# Patient Record
Sex: FEMALE | Race: BLACK OR AFRICAN AMERICAN | Employment: UNEMPLOYED | URBAN - METROPOLITAN AREA
[De-identification: names, ages, dates, MRNs, and addresses within clinical notes are randomized per-mention and may not be internally consistent; named-entity substitution may affect disease eponyms.]

---
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
## 2022-09-07 ENCOUNTER — OFFICE VISIT (OUTPATIENT)
Dept: ENDOCRINOLOGY | Age: 22
End: 2022-09-07
Payer: COMMERCIAL

## 2022-09-07 VITALS
WEIGHT: 103 LBS | OXYGEN SATURATION: 94 % | BODY MASS INDEX: 18.95 KG/M2 | DIASTOLIC BLOOD PRESSURE: 84 MMHG | HEART RATE: 77 BPM | SYSTOLIC BLOOD PRESSURE: 125 MMHG | HEIGHT: 62 IN

## 2022-09-07 DIAGNOSIS — E55.9 VITAMIN D DEFICIENCY: ICD-10-CM

## 2022-09-07 DIAGNOSIS — R73.09 ABNORMAL GLUCOSE: Primary | ICD-10-CM

## 2022-09-07 DIAGNOSIS — R63.4 WEIGHT LOSS: ICD-10-CM

## 2022-09-07 LAB — HBA1C MFR BLD: 5.2 %

## 2022-09-07 PROCEDURE — G8420 CALC BMI NORM PARAMETERS: HCPCS | Performed by: CLINICAL NURSE SPECIALIST

## 2022-09-07 PROCEDURE — 1036F TOBACCO NON-USER: CPT | Performed by: CLINICAL NURSE SPECIALIST

## 2022-09-07 PROCEDURE — G8427 DOCREV CUR MEDS BY ELIG CLIN: HCPCS | Performed by: CLINICAL NURSE SPECIALIST

## 2022-09-07 PROCEDURE — 83036 HEMOGLOBIN GLYCOSYLATED A1C: CPT | Performed by: CLINICAL NURSE SPECIALIST

## 2022-09-07 PROCEDURE — 99204 OFFICE O/P NEW MOD 45 MIN: CPT | Performed by: CLINICAL NURSE SPECIALIST

## 2022-10-19 ENCOUNTER — APPOINTMENT (OUTPATIENT)
Dept: CT IMAGING | Age: 22
End: 2022-10-19
Payer: COMMERCIAL

## 2022-10-19 ENCOUNTER — HOSPITAL ENCOUNTER (EMERGENCY)
Age: 22
Discharge: HOME | End: 2022-10-19
Attending: EMERGENCY MEDICINE
Payer: COMMERCIAL

## 2022-10-19 ENCOUNTER — HOSPITAL ENCOUNTER (EMERGENCY)
Age: 22
Discharge: HOME | End: 2022-10-19
Payer: COMMERCIAL

## 2022-10-19 VITALS
SYSTOLIC BLOOD PRESSURE: 124 MMHG | HEART RATE: 92 BPM | BODY MASS INDEX: 20.67 KG/M2 | WEIGHT: 113 LBS | TEMPERATURE: 97.5 F | RESPIRATION RATE: 17 BRPM | OXYGEN SATURATION: 99 % | DIASTOLIC BLOOD PRESSURE: 85 MMHG

## 2022-10-19 VITALS
HEIGHT: 62 IN | TEMPERATURE: 97.3 F | OXYGEN SATURATION: 100 % | HEART RATE: 98 BPM | WEIGHT: 13 LBS | BODY MASS INDEX: 2.39 KG/M2 | SYSTOLIC BLOOD PRESSURE: 127 MMHG | RESPIRATION RATE: 16 BRPM | DIASTOLIC BLOOD PRESSURE: 80 MMHG

## 2022-10-19 DIAGNOSIS — R74.8 ELEVATED LIVER ENZYMES: ICD-10-CM

## 2022-10-19 DIAGNOSIS — R19.7 VOMITING AND DIARRHEA: ICD-10-CM

## 2022-10-19 DIAGNOSIS — N30.90 CYSTITIS: Primary | ICD-10-CM

## 2022-10-19 DIAGNOSIS — R11.10 VOMITING AND DIARRHEA: ICD-10-CM

## 2022-10-19 DIAGNOSIS — N39.0 URINARY TRACT INFECTION WITHOUT HEMATURIA, SITE UNSPECIFIED: Primary | ICD-10-CM

## 2022-10-19 LAB
ALBUMIN SERPL-MCNC: 4.7 G/DL (ref 3.5–5.2)
ALP BLD-CCNC: 72 U/L (ref 35–104)
ALT SERPL-CCNC: 95 U/L (ref 0–32)
ANION GAP SERPL CALCULATED.3IONS-SCNC: 13 MMOL/L (ref 7–16)
AST SERPL-CCNC: 44 U/L (ref 0–31)
BACTERIA: ABNORMAL /HPF
BACTERIA: ABNORMAL /HPF
BASOPHILS ABSOLUTE: 0.03 E9/L (ref 0–0.2)
BASOPHILS RELATIVE PERCENT: 0.3 % (ref 0–2)
BILIRUB SERPL-MCNC: 0.7 MG/DL (ref 0–1.2)
BILIRUBIN URINE: ABNORMAL
BILIRUBIN URINE: NEGATIVE
BLOOD, URINE: ABNORMAL
BLOOD, URINE: NEGATIVE
BUN BLDV-MCNC: 10 MG/DL (ref 6–20)
CALCIUM SERPL-MCNC: 9.4 MG/DL (ref 8.6–10.2)
CHLORIDE BLD-SCNC: 105 MMOL/L (ref 98–107)
CLARITY: CLEAR
CLARITY: CLEAR
CO2: 22 MMOL/L (ref 22–29)
COLOR: ABNORMAL
COLOR: YELLOW
CREAT SERPL-MCNC: 0.8 MG/DL (ref 0.5–1)
EOSINOPHILS ABSOLUTE: 0.08 E9/L (ref 0.05–0.5)
EOSINOPHILS RELATIVE PERCENT: 0.9 % (ref 0–6)
EPITHELIAL CELLS, UA: ABNORMAL /HPF
EPITHELIAL CELLS, UA: ABNORMAL /HPF
GFR SERPL CREATININE-BSD FRML MDRD: >60 ML/MIN/1.73
GLUCOSE BLD-MCNC: 90 MG/DL (ref 74–99)
GLUCOSE URINE: 250 MG/DL
GLUCOSE URINE: NEGATIVE MG/DL
HCG(URINE) PREGNANCY TEST: NEGATIVE
HCG, URINE, POC: NEGATIVE
HCT VFR BLD CALC: 43.2 % (ref 34–48)
HEMOGLOBIN: 13.6 G/DL (ref 11.5–15.5)
IMMATURE GRANULOCYTES #: 0.04 E9/L
IMMATURE GRANULOCYTES %: 0.4 % (ref 0–5)
KETONES, URINE: 40 MG/DL
KETONES, URINE: >=80 MG/DL
LACTIC ACID: 1.2 MMOL/L (ref 0.5–2.2)
LEUKOCYTE ESTERASE, URINE: ABNORMAL
LEUKOCYTE ESTERASE, URINE: ABNORMAL
LIPASE: 25 U/L (ref 13–60)
LYMPHOCYTES ABSOLUTE: 1.91 E9/L (ref 1.5–4)
LYMPHOCYTES RELATIVE PERCENT: 21 % (ref 20–42)
Lab: NORMAL
MCH RBC QN AUTO: 26.4 PG (ref 26–35)
MCHC RBC AUTO-ENTMCNC: 31.5 % (ref 32–34.5)
MCV RBC AUTO: 83.9 FL (ref 80–99.9)
MONOCYTES ABSOLUTE: 0.81 E9/L (ref 0.1–0.95)
MONOCYTES RELATIVE PERCENT: 8.9 % (ref 2–12)
NEGATIVE QC PASS/FAIL: NORMAL
NEUTROPHILS ABSOLUTE: 6.24 E9/L (ref 1.8–7.3)
NEUTROPHILS RELATIVE PERCENT: 68.5 % (ref 43–80)
NITRITE, URINE: NEGATIVE
NITRITE, URINE: POSITIVE
PDW BLD-RTO: 13.5 FL (ref 11.5–15)
PH UA: 5 (ref 5–9)
PH UA: 5.5 (ref 5–9)
PLATELET # BLD: 248 E9/L (ref 130–450)
PMV BLD AUTO: 9.3 FL (ref 7–12)
POSITIVE QC PASS/FAIL: NORMAL
POTASSIUM REFLEX MAGNESIUM: 4 MMOL/L (ref 3.5–5)
PROTEIN UA: 100 MG/DL
PROTEIN UA: NEGATIVE MG/DL
RBC # BLD: 5.15 E12/L (ref 3.5–5.5)
RBC UA: ABNORMAL /HPF (ref 0–2)
RBC UA: ABNORMAL /HPF (ref 0–2)
SODIUM BLD-SCNC: 140 MMOL/L (ref 132–146)
SPECIFIC GRAVITY UA: 1.02 (ref 1–1.03)
SPECIFIC GRAVITY UA: >=1.03 (ref 1–1.03)
TOTAL PROTEIN: 7.9 G/DL (ref 6.4–8.3)
UROBILINOGEN, URINE: 0.2 E.U./DL
UROBILINOGEN, URINE: >=8 E.U./DL
WBC # BLD: 9.1 E9/L (ref 4.5–11.5)
WBC UA: ABNORMAL /HPF (ref 0–5)
WBC UA: ABNORMAL /HPF (ref 0–5)

## 2022-10-19 PROCEDURE — 74177 CT ABD & PELVIS W/CONTRAST: CPT

## 2022-10-19 PROCEDURE — 83605 ASSAY OF LACTIC ACID: CPT

## 2022-10-19 PROCEDURE — 36415 COLL VENOUS BLD VENIPUNCTURE: CPT

## 2022-10-19 PROCEDURE — 87088 URINE BACTERIA CULTURE: CPT

## 2022-10-19 PROCEDURE — 99283 EMERGENCY DEPT VISIT LOW MDM: CPT

## 2022-10-19 PROCEDURE — 96375 TX/PRO/DX INJ NEW DRUG ADDON: CPT

## 2022-10-19 PROCEDURE — 81025 URINE PREGNANCY TEST: CPT

## 2022-10-19 PROCEDURE — 81001 URINALYSIS AUTO W/SCOPE: CPT

## 2022-10-19 PROCEDURE — 83690 ASSAY OF LIPASE: CPT

## 2022-10-19 PROCEDURE — 80053 COMPREHEN METABOLIC PANEL: CPT

## 2022-10-19 PROCEDURE — 85025 COMPLETE CBC W/AUTO DIFF WBC: CPT

## 2022-10-19 PROCEDURE — 2580000003 HC RX 258: Performed by: PHYSICIAN ASSISTANT

## 2022-10-19 PROCEDURE — 6360000002 HC RX W HCPCS: Performed by: PHYSICIAN ASSISTANT

## 2022-10-19 PROCEDURE — 96374 THER/PROPH/DIAG INJ IV PUSH: CPT

## 2022-10-19 PROCEDURE — 96361 HYDRATE IV INFUSION ADD-ON: CPT

## 2022-10-19 PROCEDURE — 6360000004 HC RX CONTRAST MEDICATION: Performed by: RADIOLOGY

## 2022-10-19 RX ORDER — DICYCLOMINE HYDROCHLORIDE 10 MG/1
20 CAPSULE ORAL
Qty: 24 CAPSULE | Refills: 0 | Status: SHIPPED | OUTPATIENT
Start: 2022-10-19 | End: 2022-10-22

## 2022-10-19 RX ORDER — KETOROLAC TROMETHAMINE 30 MG/ML
30 INJECTION, SOLUTION INTRAMUSCULAR; INTRAVENOUS ONCE
Status: COMPLETED | OUTPATIENT
Start: 2022-10-19 | End: 2022-10-19

## 2022-10-19 RX ORDER — NAPROXEN 500 MG/1
500 TABLET ORAL 2 TIMES DAILY
Qty: 60 TABLET | Refills: 0 | Status: SHIPPED | OUTPATIENT
Start: 2022-10-19

## 2022-10-19 RX ORDER — ONDANSETRON 4 MG/1
4 TABLET, ORALLY DISINTEGRATING ORAL EVERY 8 HOURS PRN
Qty: 10 TABLET | Refills: 0 | Status: SHIPPED | OUTPATIENT
Start: 2022-10-19

## 2022-10-19 RX ORDER — PHENAZOPYRIDINE HYDROCHLORIDE 100 MG/1
100 TABLET, FILM COATED ORAL 3 TIMES DAILY PRN
Qty: 6 TABLET | Refills: 0 | Status: SHIPPED | OUTPATIENT
Start: 2022-10-19 | End: 2022-10-22

## 2022-10-19 RX ORDER — 0.9 % SODIUM CHLORIDE 0.9 %
1000 INTRAVENOUS SOLUTION INTRAVENOUS ONCE
Status: COMPLETED | OUTPATIENT
Start: 2022-10-19 | End: 2022-10-19

## 2022-10-19 RX ORDER — NITROFURANTOIN 25; 75 MG/1; MG/1
100 CAPSULE ORAL 2 TIMES DAILY
Qty: 14 CAPSULE | Refills: 0 | Status: SHIPPED | OUTPATIENT
Start: 2022-10-19 | End: 2022-10-26

## 2022-10-19 RX ORDER — ONDANSETRON 2 MG/ML
4 INJECTION INTRAMUSCULAR; INTRAVENOUS ONCE
Status: COMPLETED | OUTPATIENT
Start: 2022-10-19 | End: 2022-10-19

## 2022-10-19 RX ADMIN — ONDANSETRON 4 MG: 2 INJECTION INTRAMUSCULAR; INTRAVENOUS at 20:15

## 2022-10-19 RX ADMIN — SODIUM CHLORIDE 1000 ML: 9 INJECTION, SOLUTION INTRAVENOUS at 21:38

## 2022-10-19 RX ADMIN — IOPAMIDOL 75 ML: 755 INJECTION, SOLUTION INTRAVENOUS at 21:19

## 2022-10-19 RX ADMIN — WATER 1000 MG: 1 INJECTION INTRAMUSCULAR; INTRAVENOUS; SUBCUTANEOUS at 21:38

## 2022-10-19 RX ADMIN — KETOROLAC TROMETHAMINE 30 MG: 30 INJECTION, SOLUTION INTRAMUSCULAR at 20:14

## 2022-10-19 ASSESSMENT — ENCOUNTER SYMPTOMS
EYE REDNESS: 0
EYE PAIN: 0
DIARRHEA: 0
BLOOD IN STOOL: 0
VOMITING: 0
EYE DISCHARGE: 0
NAUSEA: 0
WHEEZING: 0
SORE THROAT: 0
SHORTNESS OF BREATH: 0
ABDOMINAL DISTENTION: 0
ABDOMINAL PAIN: 0
BACK PAIN: 0
SINUS PRESSURE: 0
COUGH: 0

## 2022-10-19 ASSESSMENT — PAIN DESCRIPTION - PAIN TYPE
TYPE: ACUTE PAIN
TYPE: ACUTE PAIN

## 2022-10-19 ASSESSMENT — PAIN - FUNCTIONAL ASSESSMENT
PAIN_FUNCTIONAL_ASSESSMENT: 0-10
PAIN_FUNCTIONAL_ASSESSMENT: 0-10

## 2022-10-19 ASSESSMENT — PAIN SCALES - GENERAL
PAINLEVEL_OUTOF10: 4
PAINLEVEL_OUTOF10: 4

## 2022-10-19 ASSESSMENT — PAIN DESCRIPTION - LOCATION
LOCATION: ABDOMEN
LOCATION: ABDOMEN

## 2022-10-19 ASSESSMENT — PAIN DESCRIPTION - ORIENTATION
ORIENTATION: LOWER;MID
ORIENTATION: LOWER

## 2022-10-19 ASSESSMENT — PAIN DESCRIPTION - FREQUENCY: FREQUENCY: CONTINUOUS

## 2022-10-19 ASSESSMENT — PAIN DESCRIPTION - ONSET: ONSET: ON-GOING

## 2022-10-19 ASSESSMENT — PAIN DESCRIPTION - DESCRIPTORS: DESCRIPTORS: SHARP

## 2022-10-19 NOTE — ED PROVIDER NOTES
[facility]   HPI:  10/19/22, Time: 6:18 PM EDT         Tirso Reddy is a 23 y.o. female presenting to the ED for abdominal pain , beginning 1 Day  ago. The complaint has been persistent, moderate in severity, and worsened by eating . Patient comes in with complaint of generalized abdominal pain with nausea vomiting and diarrhea. She states pain started last night with 2 episodes of vomiting. She denies any hematemesis. No black tarry stools or bloody stools. She was seen earlier at urgent care was treated for a UTI placed on Macrobid and Pyridium and had an episode of vomiting after that. Pain is persistent crampy pain that radiates towards her back denies any fever or chills. Patient denies any urinary frequency urgency burning. No vaginal discharges or vaginal bleeding. Review of Systems:   A complete review of systems was performed and pertinent positives and negatives are stated within HPI, all other systems reviewed and are negative.          --------------------------------------------- PAST HISTORY ---------------------------------------------  Past Medical History:  has a past medical history of Anemia. Past Surgical History:  has no past surgical history on file. Social History:  reports that she has never smoked. She has been exposed to tobacco smoke. She has never used smokeless tobacco. She reports current drug use. Drug: Marijuana Jayce Soria). She reports that she does not drink alcohol. Family History: family history includes Diabetes type 2  in her maternal grandfather; High Blood Pressure in her mother; Thyroid Disease in her mother. The patients home medications have been reviewed. Allergies: Patient has no known allergies.     -------------------------------------------------- RESULTS -------------------------------------------------  All laboratory and radiology results have been personally reviewed by myself   LABS:  Results for orders placed or performed during the hospital encounter of 10/19/22   CBC with Auto Differential   Result Value Ref Range    WBC 9.1 4.5 - 11.5 E9/L    RBC 5.15 3.50 - 5.50 E12/L    Hemoglobin 13.6 11.5 - 15.5 g/dL    Hematocrit 43.2 34.0 - 48.0 %    MCV 83.9 80.0 - 99.9 fL    MCH 26.4 26.0 - 35.0 pg    MCHC 31.5 (L) 32.0 - 34.5 %    RDW 13.5 11.5 - 15.0 fL    Platelets 404 437 - 780 E9/L    MPV 9.3 7.0 - 12.0 fL    Neutrophils % 68.5 43.0 - 80.0 %    Immature Granulocytes % 0.4 0.0 - 5.0 %    Lymphocytes % 21.0 20.0 - 42.0 %    Monocytes % 8.9 2.0 - 12.0 %    Eosinophils % 0.9 0.0 - 6.0 %    Basophils % 0.3 0.0 - 2.0 %    Neutrophils Absolute 6.24 1.80 - 7.30 E9/L    Immature Granulocytes # 0.04 E9/L    Lymphocytes Absolute 1.91 1.50 - 4.00 E9/L    Monocytes Absolute 0.81 0.10 - 0.95 E9/L    Eosinophils Absolute 0.08 0.05 - 0.50 E9/L    Basophils Absolute 0.03 0.00 - 0.20 E9/L   Comprehensive Metabolic Panel w/ Reflex to MG   Result Value Ref Range    Sodium 140 132 - 146 mmol/L    Potassium reflex Magnesium 4.0 3.5 - 5.0 mmol/L    Chloride 105 98 - 107 mmol/L    CO2 22 22 - 29 mmol/L    Anion Gap 13 7 - 16 mmol/L    Glucose 90 74 - 99 mg/dL    BUN 10 6 - 20 mg/dL    Creatinine 0.8 0.5 - 1.0 mg/dL    Est, Glom Filt Rate >60 >=60 mL/min/1.73    Calcium 9.4 8.6 - 10.2 mg/dL    Total Protein 7.9 6.4 - 8.3 g/dL    Albumin 4.7 3.5 - 5.2 g/dL    Total Bilirubin 0.7 0.0 - 1.2 mg/dL    Alkaline Phosphatase 72 35 - 104 U/L    ALT 95 (H) 0 - 32 U/L    AST 44 (H) 0 - 31 U/L   Lipase   Result Value Ref Range    Lipase 25 13 - 60 U/L   Lactic Acid   Result Value Ref Range    Lactic Acid 1.2 0.5 - 2.2 mmol/L   Urinalysis with Microscopic   Result Value Ref Range    Color, UA ORANGE (A) Straw/Yellow    Clarity, UA Clear Clear    Glucose, Ur 250 (A) Negative mg/dL    Bilirubin Urine MODERATE (A) Negative    Ketones, Urine >=80 (A) Negative mg/dL    Specific Gravity, UA 1.025 1.005 - 1.030    Blood, Urine Negative Negative    pH, UA 5.0 5.0 - 9.0 Protein,  (A) Negative mg/dL    Urobilinogen, Urine >=8.0 (A) <2.0 E.U./dL    Nitrite, Urine POSITIVE (A) Negative    Leukocyte Esterase, Urine SMALL (A) Negative    WBC, UA 10-20 (A) 0 - 5 /HPF    RBC, UA 0-1 0 - 2 /HPF    Epithelial Cells, UA MODERATE /HPF    Bacteria, UA FEW (A) None Seen /HPF   POC Pregnancy Urine Qual   Result Value Ref Range    HCG, Urine, POC Negative Negative    Lot Number [id number]     Positive QC Pass/Fail Acceptable     Negative QC Pass/Fail Acceptable        RADIOLOGY:  Interpreted by Radiologist.  CT ABDOMEN PELVIS W IV CONTRAST Additional Contrast? None   Final Result   1. Minimal nonspecific free fluid located in cul-de-sac of the pelvis. 2. No bowel obstruction, free air, or focal inflammatory changes. ------------------------- NURSING NOTES AND VITALS REVIEWED ---------------------------   The nursing notes within the ED encounter and vital signs as below have been reviewed. /85   Pulse 92   Temp 97.5 °F (36.4 °C)   Resp 17   Wt 113 lb (51.3 kg)   SpO2 99%   BMI 20.67 kg/m²   Oxygen Saturation Interpretation: Normal      ---------------------------------------------------PHYSICAL EXAM--------------------------------------      Constitutional/General: Alert and oriented x3, well appearing, non toxic in NAD  Head: Normocephalic and atraumatic  Eyes: PERRL, EOMI  Mouth: Oropharynx clear, handling secretions, no trismus  Neck: Supple, full ROM,   Pulmonary: Lungs clear to auscultation bilaterally, no wheezes, rales, or rhonchi. Not in respiratory distress  Cardiovascular:  Regular rate and rhythm, no murmurs, gallops, or rubs. 2+ distal pulses  Abdomen: Soft, generalized tenderness greater right lower quadrant non distended, bilateral CVA tenderness  Extremities: Moves all extremities x 4.  Warm and well perfused  Skin: warm and dry without rash  Neurologic: GCS 15,  Psych: Normal Affect      ------------------------------ ED COURSE/MEDICAL DECISION MAKING----------------------  Medications   ketorolac (TORADOL) injection 30 mg (30 mg IntraVENous Given 10/19/22 2014)   ondansetron (ZOFRAN) injection 4 mg (4 mg IntraVENous Given 10/19/22 2015)   0.9 % sodium chloride bolus (0 mLs IntraVENous Stopped 10/19/22 2216)   cefTRIAXone (ROCEPHIN) 1,000 mg in sterile water 10 mL IV syringe (1,000 mg IntraVENous Given 10/19/22 2138)   iopamidol (ISOVUE-370) 76 % injection 75 mL (75 mLs IntraVENous Given 10/19/22 2119)         ED COURSE:     2155 updated on CT findings. She is to receive remainder of IV fluids and give a p.o. challenge if tolerated discharge to home      Medical Decision Making:    Patient was seen earlier today at urgent care treated for UTI placed on Macrobid and Pyridium. Patient was complaining of generalized abdominal pain with nausea vomiting that started last evening. Labs were obtained there was no leukocytosis there was elevated ALT AST. On CT gallbladder without any acute findings. No appendicitis present there is some free fluid in the pelvis possible ovarian cyst rupture. She was given a dose of Rocephin for the UTI she can continue with the Macrobid and Pyridium discharged with some Bentyl Naprosyn and Zofran. Follow-up primary care 1 to 2 days return to ER any worsening symptoms    Counseling: The emergency provider has spoken with the patient and discussed todays results, in addition to providing specific details for the plan of care and counseling regarding the diagnosis and prognosis. Questions are answered at this time and they are agreeable with the plan.      --------------------------------- IMPRESSION AND DISPOSITION ---------------------------------    IMPRESSION  1. Urinary tract infection without hematuria, site unspecified    2. Elevated liver enzymes    3.  Vomiting and diarrhea        DISPOSITION  Disposition: Discharge to home  Patient condition is good      NOTE: This report was transcribed using voice recognition software.  Every effort was made to ensure accuracy; however, inadvertent computerized transcription errors may be present      Marcus Dale  10/19/22 3156

## 2022-10-19 NOTE — ED PROVIDER NOTES
The history is provided by the patient. Dysuria   This is a new problem. The current episode started 2 days ago. The problem occurs intermittently. The pain is mild. There has been no fever. Associated symptoms include frequency, urgency and flank pain. Pertinent negatives include no chills, no nausea, no vomiting and no discharge. Review of Systems   Constitutional:  Negative for chills and fever. HENT:  Negative for ear pain, sinus pressure and sore throat. Eyes:  Negative for pain, discharge and redness. Respiratory:  Negative for cough, shortness of breath and wheezing. Cardiovascular:  Negative for chest pain. Gastrointestinal:  Negative for abdominal distention, abdominal pain, blood in stool, diarrhea, nausea and vomiting. Genitourinary:  Positive for dysuria, flank pain, frequency and urgency. Musculoskeletal:  Negative for arthralgias and back pain. Skin:  Negative for rash and wound. Neurological:  Negative for weakness and headaches. Hematological:  Negative for adenopathy. All other systems reviewed and are negative. Physical Exam  Vitals and nursing note reviewed. Constitutional:       Appearance: She is well-developed. HENT:      Head: Normocephalic and atraumatic. Right Ear: Hearing and external ear normal.      Left Ear: Hearing and external ear normal.      Nose: Nose normal.      Mouth/Throat:      Pharynx: Uvula midline. Eyes:      General: Lids are normal.      Conjunctiva/sclera: Conjunctivae normal.      Pupils: Pupils are equal, round, and reactive to light. Cardiovascular:      Rate and Rhythm: Normal rate and regular rhythm. Heart sounds: Normal heart sounds. No murmur heard. Pulmonary:      Effort: Pulmonary effort is normal. No respiratory distress. Breath sounds: Normal breath sounds. No wheezing or rales. Abdominal:      General: Bowel sounds are normal.      Palpations: Abdomen is soft. Abdomen is not rigid.       Tenderness: There is abdominal tenderness in the suprapubic area. There is no guarding or rebound. Musculoskeletal:      Cervical back: Normal range of motion and neck supple. Skin:     General: Skin is warm and dry. Findings: No abrasion or rash. Neurological:      Mental Status: She is alert and oriented to person, place, and time. GCS: GCS eye subscore is 4. GCS verbal subscore is 5. GCS motor subscore is 6. Cranial Nerves: No cranial nerve deficit. Sensory: No sensory deficit. Coordination: Coordination normal.      Gait: Gait normal.        Procedures     MDM          --------------------------------------------- PAST HISTORY ---------------------------------------------  Past Medical History:  has a past medical history of Anemia. Past Surgical History:  has no past surgical history on file. Social History:  reports that she has never smoked. She has been exposed to tobacco smoke. She has never used smokeless tobacco. She reports current drug use. Drug: Marijuana Tanner Hail). She reports that she does not drink alcohol. Family History: family history includes Diabetes type 2  in her maternal grandfather; High Blood Pressure in her mother; Thyroid Disease in her mother. The patients home medications have been reviewed. Allergies: Patient has no known allergies.     -------------------------------------------------- RESULTS -------------------------------------------------  Labs:  Results for orders placed or performed during the hospital encounter of 10/19/22   Urinalysis   Result Value Ref Range    Color, UA Yellow Straw/Yellow    Clarity, UA Clear Clear    Glucose, Ur Negative Negative mg/dL    Bilirubin Urine Negative Negative    Ketones, Urine 40 (A) Negative mg/dL    Specific Gravity, UA >=1.030 1.005 - 1.030    Blood, Urine TRACE (A) Negative    pH, UA 5.5 5.0 - 9.0    Protein, UA Negative Negative mg/dL    Urobilinogen, Urine 0.2 <2.0 E.U./dL    Nitrite, Urine Negative Negative    Leukocyte Esterase, Urine TRACE (A) Negative   Pregnancy, Urine   Result Value Ref Range    HCG(Urine) Pregnancy Test NEGATIVE NEGATIVE   Microscopic Urinalysis   Result Value Ref Range    WBC, UA 1-3 0 - 5 /HPF    RBC, UA 0-1 0 - 2 /HPF    Epithelial Cells, UA MODERATE /HPF    Bacteria, UA MODERATE (A) None Seen /HPF       Radiology:  No orders to display       ------------------------- NURSING NOTES AND VITALS REVIEWED ---------------------------  Date / Time Roomed:  10/19/2022 12:23 PM  ED Bed Assignment:  01/01    The nursing notes within the ED encounter and vital signs as below have been reviewed. /80   Pulse 98   Temp 97.3 °F (36.3 °C) (Temporal)   Resp 16   Ht 5' 2\" (1.575 m)   Wt 13 lb (5.897 kg)   SpO2 100%   BMI 2.38 kg/m²   Oxygen Saturation Interpretation: Normal      ------------------------------------------ PROGRESS NOTES ------------------------------------------  I have spoken with the patient and discussed todays results, in addition to providing specific details for the plan of care and counseling regarding the diagnosis and prognosis. Their questions are answered at this time and they are agreeable with the plan. I discussed at length with them reasons for immediate return here for re evaluation. They will followup with primary care by calling their office tomorrow. Medications - No data to display      --------------------------------- ADDITIONAL PROVIDER NOTES ---------------------------------  At this time the patient is without objective evidence of an acute process requiring hospitalization or inpatient management. They have remained hemodynamically stable throughout their entire ED visit and are stable for discharge with outpatient follow-up. The plan has been discussed in detail and they are aware of the specific conditions for emergent return, as well as the importance of follow-up.       New Prescriptions    NITROFURANTOIN, MACROCRYSTAL-MONOHYDRATE, (MACROBID) 100 MG CAPSULE    Take 1 capsule by mouth 2 times daily for 7 days    PHENAZOPYRIDINE (PYRIDIUM) 100 MG TABLET    Take 1 tablet by mouth 3 times daily as needed for Pain       Diagnosis:  1. Cystitis        Disposition:  Patient's disposition: Discharge to home  Patient's condition is stable.                     Naif Okeefe MD  10/19/22 8991

## 2022-10-22 LAB — URINE CULTURE, ROUTINE: NORMAL

## 2023-06-26 ENCOUNTER — HOSPITAL ENCOUNTER (OUTPATIENT)
Age: 23
Discharge: HOME | End: 2023-06-26
Payer: COMMERCIAL

## 2023-06-26 LAB
BASOPHILS # BLD: 0.04 E9/L (ref 0–0.2)
BASOPHILS NFR BLD: 0.5 % (ref 0–2)
C3 SERPL-MCNC: 153 MG/DL (ref 90–180)
C4 SERPL-MCNC: 28 MG/DL (ref 10–40)
EOSINOPHIL # BLD: 0.47 E9/L (ref 0.05–0.5)
EOSINOPHIL NFR BLD: 5.8 % (ref 0–6)
ERYTHROCYTE [DISTWIDTH] IN BLOOD BY AUTOMATED COUNT: 14.2 FL (ref 11.5–15)
HCT VFR BLD AUTO: 44.2 % (ref 34–48)
HGB BLD-MCNC: 13.8 G/DL (ref 11.5–15.5)
IMM GRANULOCYTES # BLD: 0.03 E9/L
IMM GRANULOCYTES NFR BLD: 0.4 % (ref 0–5)
LYMPHOCYTES # BLD: 2.17 E9/L (ref 1.5–4)
LYMPHOCYTES NFR BLD: 27 % (ref 20–42)
MCH RBC QN AUTO: 26.7 PG (ref 26–35)
MCHC RBC AUTO-ENTMCNC: 31.2 % (ref 32–34.5)
MCV RBC AUTO: 85.7 FL (ref 80–99.9)
MONOCYTES # BLD: 0.48 E9/L (ref 0.1–0.95)
MONOCYTES NFR BLD: 6 % (ref 2–12)
NEUTROPHILS # BLD: 4.85 E9/L (ref 1.8–7.3)
NEUTS SEG NFR BLD: 60.3 % (ref 43–80)
PLATELET # BLD AUTO: 264 E9/L (ref 130–450)
PMV BLD AUTO: 9.1 FL (ref 7–12)
RBC # BLD AUTO: 5.16 E12/L (ref 3.5–5.5)
WBC # BLD: 8 E9/L (ref 4.5–11.5)

## 2023-06-26 PROCEDURE — 36415 COLL VENOUS BLD VENIPUNCTURE: CPT

## 2023-06-26 PROCEDURE — 82785 ASSAY OF IGE: CPT

## 2023-06-26 PROCEDURE — 83520 IMMUNOASSAY QUANT NOS NONAB: CPT

## 2023-06-26 PROCEDURE — 86160 COMPLEMENT ANTIGEN: CPT

## 2023-06-26 PROCEDURE — 86162 COMPLEMENT TOTAL (CH50): CPT

## 2023-06-26 PROCEDURE — 85025 COMPLETE CBC W/AUTO DIFF WBC: CPT

## 2023-06-29 LAB
COMPLEMENT ACTIVITY, TOTAL TURBIDIMETRIC: >95 U/ML (ref 38.7–89.9)
IGE SERPL-ACNC: 40 KU/L
MISCELLANEOUS LAB TEST RESULT: NORMAL

## 2023-07-14 ENCOUNTER — HOSPITAL ENCOUNTER (OUTPATIENT)
Age: 23
Discharge: HOME | End: 2023-07-14
Payer: COMMERCIAL

## 2023-07-14 DIAGNOSIS — R30.0 DYSURIA: ICD-10-CM

## 2023-07-14 LAB
BACTERIA URNS QL MICRO: NORMAL /HPF
BILIRUB UR QL STRIP: NEGATIVE
CLARITY UR: CLEAR
COLOR UR: YELLOW
EPI CELLS #/AREA URNS HPF: NORMAL /HPF
GLUCOSE UR STRIP-MCNC: NEGATIVE MG/DL
HGB UR QL STRIP: NEGATIVE
KETONES UR STRIP-MCNC: NEGATIVE MG/DL
LEUKOCYTE ESTERASE UR QL STRIP: ABNORMAL
NITRITE UR QL STRIP: NEGATIVE
PH UR STRIP: 6 [PH] (ref 5–9)
PROT UR STRIP-MCNC: NEGATIVE MG/DL
RBC #/AREA URNS HPF: NORMAL /HPF (ref 0–2)
SP GR UR STRIP: 1.02 (ref 1–1.03)
UROBILINOGEN UR STRIP-ACNC: 0.2 E.U./DL
WBC #/AREA URNS HPF: NORMAL /HPF (ref 0–5)

## 2023-07-14 PROCEDURE — 81001 URINALYSIS AUTO W/SCOPE: CPT

## 2023-07-14 PROCEDURE — 87088 URINE BACTERIA CULTURE: CPT

## 2023-07-16 LAB — BACTERIA UR CULT: NORMAL

## 2023-08-01 DIAGNOSIS — M79.672 LEFT FOOT PAIN: Primary | ICD-10-CM

## 2023-08-08 ENCOUNTER — OFFICE VISIT (OUTPATIENT)
Dept: PODIATRY | Age: 23
End: 2023-08-08
Payer: COMMERCIAL

## 2023-08-08 VITALS — HEIGHT: 62 IN | WEIGHT: 123 LBS | BODY MASS INDEX: 22.63 KG/M2

## 2023-08-08 DIAGNOSIS — M79.672 PAIN IN LEFT FOOT: ICD-10-CM

## 2023-08-08 DIAGNOSIS — R26.2 DIFFICULTY WALKING: ICD-10-CM

## 2023-08-08 DIAGNOSIS — S93.622A LISFRANC'S SPRAIN, LEFT, INITIAL ENCOUNTER: Primary | ICD-10-CM

## 2023-08-08 PROCEDURE — G8427 DOCREV CUR MEDS BY ELIG CLIN: HCPCS | Performed by: PODIATRIST

## 2023-08-08 PROCEDURE — 99203 OFFICE O/P NEW LOW 30 MIN: CPT | Performed by: PODIATRIST

## 2023-08-08 PROCEDURE — G8420 CALC BMI NORM PARAMETERS: HCPCS | Performed by: PODIATRIST

## 2023-08-08 PROCEDURE — 1036F TOBACCO NON-USER: CPT | Performed by: PODIATRIST

## 2023-08-09 NOTE — PROGRESS NOTES
Patient is in today for evaluation of left foot pain. Patient says she hurt her foot last year and said it has continued to hurt, swell, and has tingling in her toes.  Pcp is HAIDER Cornejo - CNP  Last ov 7/8/23
Mother     Thyroid Disease Mother     Diabetes type 2  Maternal Grandfather      No past surgical history on file. Social History     Tobacco Use    Smoking status: Never     Passive exposure: Yes    Smokeless tobacco: Never   Vaping Use    Vaping Use: Never used   Substance Use Topics    Alcohol use: No    Drug use: Yes     Types: Marijuana Corina Brian)     Comment: occ           Diagnostic studies:    No results found. Procedures:    None    Exam:  VASCULAR: Pedal pulses palpable left foot. Capillary refill time brisk digits 1 through 5 left foot  NEUROLOGICAL: Epicritic sensations intact left foot  DERMATOLOGICAL: Mild edema noted overlying the left midfoot region, without ecchymotic skin changes present. MUSCULOSKELETAL: Tenderness noted to palpation to the Lisfranc ligament area medial first/second metatarsal cuneiform region. Tenderness also noted to palpation into the plantar left arch region. Mild antalgic gait noted upon evaluation. Plan Per Assessment  Bindu Ibarra was seen today for foot pain. Diagnoses and all orders for this visit:    Lisfranc's sprain, left, initial encounter    Pain in left foot    Difficulty walking        New patient evaluation and management  Radiographs were ordered and reviewed with patient 3 views left foot. No acute osseous abnormalities noted left foot. We did discuss obtaining an MRI of the left midfoot region to assess integrity of the Lisfranc ligament area due to initial injury several months ago and continued disability, swelling, and pain present. Due to severity of symptoms, chronicity of symptoms, physical exam, and failure of conservative treatment, an MRI of the left midfoot will be obtained to further delineate pathology and aid in medical decision making/surgical planning. Patient will follow up to discuss results, and further treatment options available for care.       Seen By:    Ruthy Barkley DPM    Electronically signed by Gwendolyn Turk

## 2023-08-29 ENCOUNTER — HOSPITAL ENCOUNTER (EMERGENCY)
Age: 23
Discharge: HOME | End: 2023-08-30
Attending: STUDENT IN AN ORGANIZED HEALTH CARE EDUCATION/TRAINING PROGRAM
Payer: COMMERCIAL

## 2023-08-29 DIAGNOSIS — J06.9 UPPER RESPIRATORY TRACT INFECTION, UNSPECIFIED TYPE: Primary | ICD-10-CM

## 2023-08-29 LAB
BASOPHILS # BLD: 0.03 K/UL (ref 0–0.2)
BASOPHILS NFR BLD: 0 % (ref 0–2)
EOSINOPHIL # BLD: 0.19 K/UL (ref 0.05–0.5)
EOSINOPHILS RELATIVE PERCENT: 2 % (ref 0–6)
ERYTHROCYTE [DISTWIDTH] IN BLOOD BY AUTOMATED COUNT: 13.1 % (ref 11.5–15)
HCG, URINE, POC: NEGATIVE
HCT VFR BLD AUTO: 44.1 % (ref 34–48)
HGB BLD-MCNC: 14 G/DL (ref 11.5–15.5)
IMM GRANULOCYTES # BLD AUTO: 0.03 K/UL (ref 0–0.58)
IMM GRANULOCYTES NFR BLD: 0 % (ref 0–5)
LYMPHOCYTES NFR BLD: 1.1 K/UL (ref 1.5–4)
LYMPHOCYTES RELATIVE PERCENT: 11 % (ref 20–42)
Lab: NORMAL
MCH RBC QN AUTO: 26.4 PG (ref 26–35)
MCHC RBC AUTO-ENTMCNC: 31.7 G/DL (ref 32–34.5)
MCV RBC AUTO: 83.2 FL (ref 80–99.9)
MONOCYTES NFR BLD: 0.84 K/UL (ref 0.1–0.95)
MONOCYTES NFR BLD: 9 % (ref 2–12)
NEGATIVE QC PASS/FAIL: NORMAL
NEUTROPHILS NFR BLD: 77 % (ref 43–80)
NEUTS SEG NFR BLD: 7.43 K/UL (ref 1.8–7.3)
PLATELET # BLD AUTO: 221 K/UL (ref 130–450)
PMV BLD AUTO: 9.9 FL (ref 7–12)
POSITIVE QC PASS/FAIL: NORMAL
RBC # BLD AUTO: 5.3 M/UL (ref 3.5–5.5)
WBC OTHER # BLD: 9.6 K/UL (ref 4.5–11.5)

## 2023-08-29 PROCEDURE — 96374 THER/PROPH/DIAG INJ IV PUSH: CPT

## 2023-08-29 PROCEDURE — 80053 COMPREHEN METABOLIC PANEL: CPT

## 2023-08-29 PROCEDURE — 85025 COMPLETE CBC W/AUTO DIFF WBC: CPT

## 2023-08-29 PROCEDURE — 83605 ASSAY OF LACTIC ACID: CPT

## 2023-08-29 PROCEDURE — 87086 URINE CULTURE/COLONY COUNT: CPT

## 2023-08-29 PROCEDURE — 87635 SARS-COV-2 COVID-19 AMP PRB: CPT

## 2023-08-29 PROCEDURE — 99284 EMERGENCY DEPT VISIT MOD MDM: CPT

## 2023-08-29 PROCEDURE — 81001 URINALYSIS AUTO W/SCOPE: CPT

## 2023-08-29 PROCEDURE — 83690 ASSAY OF LIPASE: CPT

## 2023-08-29 RX ORDER — KETOROLAC TROMETHAMINE 15 MG/ML
15 INJECTION, SOLUTION INTRAMUSCULAR; INTRAVENOUS ONCE
Status: COMPLETED | OUTPATIENT
Start: 2023-08-30 | End: 2023-08-30

## 2023-08-29 RX ORDER — 0.9 % SODIUM CHLORIDE 0.9 %
1000 INTRAVENOUS SOLUTION INTRAVENOUS ONCE
Status: COMPLETED | OUTPATIENT
Start: 2023-08-30 | End: 2023-08-30

## 2023-08-29 ASSESSMENT — PAIN DESCRIPTION - PAIN TYPE: TYPE: ACUTE PAIN

## 2023-08-29 ASSESSMENT — PAIN SCALES - GENERAL: PAINLEVEL_OUTOF10: 5

## 2023-08-29 ASSESSMENT — LIFESTYLE VARIABLES: HOW MANY STANDARD DRINKS CONTAINING ALCOHOL DO YOU HAVE ON A TYPICAL DAY: PATIENT DOES NOT DRINK

## 2023-08-29 ASSESSMENT — PAIN - FUNCTIONAL ASSESSMENT: PAIN_FUNCTIONAL_ASSESSMENT: 0-10

## 2023-08-29 ASSESSMENT — PAIN DESCRIPTION - FREQUENCY: FREQUENCY: CONTINUOUS

## 2023-08-29 ASSESSMENT — PAIN DESCRIPTION - LOCATION: LOCATION: ABDOMEN;BACK

## 2023-08-30 ENCOUNTER — APPOINTMENT (OUTPATIENT)
Dept: GENERAL RADIOLOGY | Age: 23
End: 2023-08-30
Payer: COMMERCIAL

## 2023-08-30 VITALS
SYSTOLIC BLOOD PRESSURE: 124 MMHG | DIASTOLIC BLOOD PRESSURE: 78 MMHG | TEMPERATURE: 98.8 F | OXYGEN SATURATION: 99 % | RESPIRATION RATE: 18 BRPM | WEIGHT: 119 LBS | HEART RATE: 85 BPM | BODY MASS INDEX: 21.9 KG/M2 | HEIGHT: 62 IN

## 2023-08-30 LAB
ALBUMIN SERPL-MCNC: 4.8 G/DL (ref 3.5–5.2)
ALP SERPL-CCNC: 126 U/L (ref 35–104)
ALT SERPL-CCNC: 10 U/L (ref 0–32)
ANION GAP SERPL CALCULATED.3IONS-SCNC: 14 MMOL/L (ref 7–16)
AST SERPL-CCNC: 11 U/L (ref 0–31)
BACTERIA URNS QL MICRO: ABNORMAL
BILIRUB SERPL-MCNC: 0.8 MG/DL (ref 0–1.2)
BILIRUB UR QL STRIP: ABNORMAL
BUN SERPL-MCNC: 7 MG/DL (ref 6–20)
CALCIUM SERPL-MCNC: 9.5 MG/DL (ref 8.6–10.2)
CHLORIDE SERPL-SCNC: 103 MMOL/L (ref 98–107)
CLARITY UR: CLEAR
CO2 SERPL-SCNC: 20 MMOL/L (ref 22–29)
COLOR UR: ABNORMAL
CREAT SERPL-MCNC: 0.8 MG/DL (ref 0.5–1)
EPI CELLS #/AREA URNS HPF: ABNORMAL /HPF
GFR SERPL CREATININE-BSD FRML MDRD: >60 ML/MIN/1.73M2
GLUCOSE SERPL-MCNC: 81 MG/DL (ref 74–99)
GLUCOSE UR STRIP-MCNC: NEGATIVE MG/DL
HGB UR QL STRIP.AUTO: ABNORMAL
KETONES UR STRIP-MCNC: >80 MG/DL
LACTATE BLDV-SCNC: 1.1 MMOL/L (ref 0.5–2.2)
LEUKOCYTE ESTERASE UR QL STRIP: NEGATIVE
LIPASE SERPL-CCNC: 18 U/L (ref 13–60)
NITRITE UR QL STRIP: NEGATIVE
PH UR STRIP: 6 [PH] (ref 5–9)
POTASSIUM SERPL-SCNC: 3.8 MMOL/L (ref 3.5–5)
PROT SERPL-MCNC: 8.2 G/DL (ref 6.4–8.3)
PROT UR STRIP-MCNC: ABNORMAL MG/DL
RBC #/AREA URNS HPF: ABNORMAL /HPF
SARS-COV-2 RDRP RESP QL NAA+PROBE: NOT DETECTED
SODIUM SERPL-SCNC: 137 MMOL/L (ref 132–146)
SP GR UR STRIP: >1.03 (ref 1–1.03)
SPECIMEN DESCRIPTION: NORMAL
SPECIMEN SOURCE: NORMAL
STREP A, MOLECULAR: NEGATIVE
UROBILINOGEN UR STRIP-ACNC: 0.2 EU/DL (ref 0–1)
WBC #/AREA URNS HPF: ABNORMAL /HPF

## 2023-08-30 PROCEDURE — 71045 X-RAY EXAM CHEST 1 VIEW: CPT

## 2023-08-30 PROCEDURE — 2580000003 HC RX 258

## 2023-08-30 PROCEDURE — 6360000002 HC RX W HCPCS

## 2023-08-30 RX ORDER — AZITHROMYCIN 250 MG/1
TABLET, FILM COATED ORAL
Qty: 1 PACKET | Refills: 0 | Status: SHIPPED | OUTPATIENT
Start: 2023-08-30 | End: 2023-09-03

## 2023-08-30 RX ORDER — PREDNISONE 20 MG/1
20 TABLET ORAL 2 TIMES DAILY
Qty: 10 TABLET | Refills: 0 | Status: SHIPPED | OUTPATIENT
Start: 2023-08-30 | End: 2023-09-04

## 2023-08-30 RX ADMIN — KETOROLAC TROMETHAMINE 15 MG: 15 INJECTION, SOLUTION INTRAMUSCULAR; INTRAVENOUS at 00:02

## 2023-08-30 RX ADMIN — SODIUM CHLORIDE 1000 ML: 9 INJECTION, SOLUTION INTRAVENOUS at 00:02

## 2023-08-30 ASSESSMENT — PAIN SCALES - GENERAL: PAINLEVEL_OUTOF10: 4

## 2023-08-30 NOTE — ED NOTES
Department of Emergency Medicine  FIRST PROVIDER TRIAGE NOTE             [facility]           8/29/23  10:48 PM EDT    Date of Encounter: 8/29/23   MRN: [id number]      HPI: Caleb Duarte is a 26 y.o. female who presents to the ED for No chief complaint on file. headache sinus congestion no seezing lower back pain , abdominal pain     ROS: Negative for Suicidal ideation or Homicidal Ideation. PE: Gen Appearance/Constitutional: alert     Initial Plan of Care: All treatment areas with department are currently occupied. Plan to order/Initiate the following while awaiting opening in ED: labs.   Initiate Treatment-Testing, Proceed toTreatment Area When Bed Available for ED Attending/MLP to Continue Care    Electronically signed by CHRISTIAN Alamo   DD: 8/29/23      CHRISTIAN Alamo  08/30/23 8346

## 2023-08-31 LAB
MICROORGANISM SPEC CULT: ABNORMAL
SPECIMEN DESCRIPTION: ABNORMAL

## 2023-09-13 ENCOUNTER — OFFICE VISIT (OUTPATIENT)
Dept: PODIATRY | Age: 23
End: 2023-09-13
Payer: COMMERCIAL

## 2023-09-13 VITALS — BODY MASS INDEX: 21.9 KG/M2 | HEIGHT: 62 IN | WEIGHT: 119 LBS

## 2023-09-13 DIAGNOSIS — M79.672 PAIN IN LEFT FOOT: ICD-10-CM

## 2023-09-13 DIAGNOSIS — S93.622A LISFRANC'S SPRAIN, LEFT, INITIAL ENCOUNTER: Primary | ICD-10-CM

## 2023-09-13 DIAGNOSIS — R26.2 DIFFICULTY WALKING: ICD-10-CM

## 2023-09-13 PROCEDURE — 99213 OFFICE O/P EST LOW 20 MIN: CPT | Performed by: PODIATRIST

## 2023-09-13 PROCEDURE — G8427 DOCREV CUR MEDS BY ELIG CLIN: HCPCS | Performed by: PODIATRIST

## 2023-09-13 PROCEDURE — 1036F TOBACCO NON-USER: CPT | Performed by: PODIATRIST

## 2023-09-13 PROCEDURE — G8420 CALC BMI NORM PARAMETERS: HCPCS | Performed by: PODIATRIST

## 2023-09-13 NOTE — PROGRESS NOTES
23     Viji Reas    : 2000   Sex: female    Age: 26 y.o. Patient's PCP/Provider is:  HAIDER Aguillon NP    Subjective:  Patient is seen today for follow-up regarding continued care regarding left foot sprain. Patient presents wearing her slides today with mild antalgic gait noted. Patient presents today to discuss her MRI results left lower extremity. No other additional abnormalities noted at this time. Chief Complaint   Patient presents with    Follow-up     MRI results        ROS:  Const: Positives and pertinent negatives as per HPI. Musculo: Denies symptoms other than stated above. Neuro: Denies symptoms other than stated above. Skin: Denies symptoms other than stated above. Current Medications:    Current Outpatient Medications:     ondansetron (ZOFRAN ODT) 4 MG disintegrating tablet, Take 1 tablet by mouth every 8 hours as needed for Nausea or Vomiting, Disp: 10 tablet, Rfl: 0    naproxen (NAPROSYN) 500 MG tablet, Take 1 tablet by mouth 2 times daily, Disp: 60 tablet, Rfl: 0    MedroxyPROGESTERone Acetate (DEPO-PROVERA IM), Inject into the muscle, Disp: , Rfl:     dicyclomine (BENTYL) 10 MG capsule, Take 2 capsules by mouth 4 times daily (before meals and nightly) for 3 days, Disp: 24 capsule, Rfl: 0    Allergies:  No Known Allergies    Vitals:    23 1304   Weight: 119 lb (54 kg)   Height: 5' 2\" (1.575 m)       Exam:  Neurovascular status unchanged. Mild tenderness noted over the left dorsal midfoot region. Minimal tenderness noted into the Lisfranc region left midfoot. Edematous issues stable left lower extremity. Increased range of motion noted left ankle and subtalar joint region. Mild antalgic gait noted upon evaluation. Diagnostic Studies:     No results found. Procedures:    None    Plan Per Assessment  Ave Linton was seen today for follow-up.     Diagnoses and all orders for this visit:    Lisfranc's sprain, left, initial encounter  -

## 2023-09-19 ENCOUNTER — TELEPHONE (OUTPATIENT)
Dept: PHYSICAL THERAPY | Age: 23
End: 2023-09-19

## 2023-09-19 NOTE — TELEPHONE ENCOUNTER
Date: 2023       Patient Name: Kyle Yang  : 2000      MRN: [id number]    No show/no call for PT evaluation scheduled at 1230 today.     Sohail Hamilton, PT

## 2023-10-04 ENCOUNTER — OFFICE VISIT (OUTPATIENT)
Dept: PODIATRY | Age: 23
End: 2023-10-04
Payer: COMMERCIAL

## 2023-10-04 VITALS — WEIGHT: 119 LBS | HEIGHT: 62 IN | BODY MASS INDEX: 21.9 KG/M2

## 2023-10-04 DIAGNOSIS — M79.672 PAIN IN LEFT FOOT: ICD-10-CM

## 2023-10-04 DIAGNOSIS — R26.2 DIFFICULTY WALKING: ICD-10-CM

## 2023-10-04 DIAGNOSIS — S93.622A LISFRANC'S SPRAIN, LEFT, INITIAL ENCOUNTER: Primary | ICD-10-CM

## 2023-10-04 PROCEDURE — 1036F TOBACCO NON-USER: CPT | Performed by: PODIATRIST

## 2023-10-04 PROCEDURE — 99213 OFFICE O/P EST LOW 20 MIN: CPT | Performed by: PODIATRIST

## 2023-10-04 PROCEDURE — G8484 FLU IMMUNIZE NO ADMIN: HCPCS | Performed by: PODIATRIST

## 2023-10-04 PROCEDURE — G8420 CALC BMI NORM PARAMETERS: HCPCS | Performed by: PODIATRIST

## 2023-10-04 PROCEDURE — G8427 DOCREV CUR MEDS BY ELIG CLIN: HCPCS | Performed by: PODIATRIST

## 2023-10-04 NOTE — PROGRESS NOTES
Patient is in today for 3 week left foot follow up. Patient has not been able to complete PT at this time.  Pcp is HAIDER Chaudhary - NP  Last ov 7/8/23

## 2023-10-04 NOTE — PROGRESS NOTES
10/4/23     Macie May    : 2000   Sex: female    Age: 26 y.o. Patient's PCP/Provider is:  HAIDER Lo NP    Subjective:  Patient is seen today for follow-up regarding Lisfranc sprain left foot. Patient did not start her physical therapy as recommended to this point in time. Patient is still trying to wear appropriate shoe gear daily which has helped mildly with symptoms. She denies any additional injuries to the left foot or any other abnormalities at this time. Chief Complaint   Patient presents with    Foot Pain     Left foot        ROS:  Const: Positives and pertinent negatives as per HPI. Musculo: Denies symptoms other than stated above. Neuro: Denies symptoms other than stated above. Skin: Denies symptoms other than stated above. Current Medications:    Current Outpatient Medications:     ondansetron (ZOFRAN ODT) 4 MG disintegrating tablet, Take 1 tablet by mouth every 8 hours as needed for Nausea or Vomiting, Disp: 10 tablet, Rfl: 0    naproxen (NAPROSYN) 500 MG tablet, Take 1 tablet by mouth 2 times daily, Disp: 60 tablet, Rfl: 0    MedroxyPROGESTERone Acetate (DEPO-PROVERA IM), Inject into the muscle, Disp: , Rfl:     dicyclomine (BENTYL) 10 MG capsule, Take 2 capsules by mouth 4 times daily (before meals and nightly) for 3 days, Disp: 24 capsule, Rfl: 0    Allergies:  No Known Allergies    Vitals:    10/04/23 1414   Weight: 119 lb (54 kg)   Height: 5' 2\" (1.575 m)       Exam:  Neurovascular status unchanged. Range of motion stable left hindfoot and midfoot regions. Edematous issues stable left lower extremity. Mild residual antalgic gait noted left lower extremity. Diagnostic Studies:     No results found. Procedures:    None    Plan Per Assessment  Sunny Madrid was seen today for foot pain.     Diagnoses and all orders for this visit:    Lisfranc's sprain, left, initial encounter    Pain in left foot    Difficulty walking      Evaluation and

## 2023-10-11 ENCOUNTER — EVALUATION (OUTPATIENT)
Dept: PHYSICAL THERAPY | Age: 23
End: 2023-10-11

## 2023-10-11 DIAGNOSIS — R26.2 DIFFICULTY WALKING: ICD-10-CM

## 2023-10-11 DIAGNOSIS — S93.622A LISFRANC'S SPRAIN, LEFT, INITIAL ENCOUNTER: Primary | ICD-10-CM

## 2023-10-11 DIAGNOSIS — M79.672 PAIN IN LEFT FOOT: ICD-10-CM

## 2023-10-11 NOTE — PROGRESS NOTES
Physical Therapy Daily Treatment Note    Date: 10/11/2023  Patient Name: Greogrio Bennett  : 2000   MRN: [id number]  DOInjury: 2023  DOSx: --  Referring Provider: Zachary Cleaning, DPM  [address] [facility],  [address] [place] [address]     Medical Diagnosis:      Diagnosis Orders   1. Lisfranc's sprain, left, initial encounter        2. Pain in left foot        3. Difficulty walking          Nadia Kerns has a complex case with pain that developed 3 months ago with no known cause. She reports neurological symptoms (tingling) between 1st and 2nd toes and in the calf. Compression of the toes / met heads as well as plantar flexion combined with inversion increases the report of tingling. Dorsiflexion combined with eversion relieves symptoms. She also appears to have weakness of the foot as well. It is possible she has some involvement of branches of the peroneal nerve. Treatment will be to maximize strength and function while not provoking neurological symptoms. Will monitor motor function over time. Outcome Measure:   Lower Extremity Functional Scale (LEFS) 10% impairment      Access Code: [email or web address]  URL: [email or web address]  Date: 10/11/2023  Prepared by: Shahla Anjali    Exercises  - Towel Scrunches  - 2 x daily - 2 sets - 20 reps  - Ball Rolling  - 2 x daily - 2 sets - 20 reps    X = TO BE PERFORMED NEXT VISIT  > = PROGRESS TO THIS    S: See mabel  O:   Time 6112-6312     Visit -12    Patient wants to start with 1/week. Repeat outcome measure at mid point and end. Pain Pain 4/10     ROM      Modalities         MO   Manual            Stretch      PROM  ?         TE      TE   Exercise      Ball / can rolling 2 x 20  NR   Toe curls 2 x 20  NR   Arch doming X     Toe spreading  X Rubber band resistance at home    Toe yoga  (Alternating big toe down / other toes up) X     BAPS  Progress from standing with involved foot on board other foot on floor > standing 2-footed > standing 1-footed X
required and that they will be provided while the patient is under my care.     Physician's Comments/Revisions:               Physician's Printed Name:                                           Physician's Signature:                                                               Date:

## 2023-10-18 ENCOUNTER — OFFICE VISIT (OUTPATIENT)
Dept: PODIATRY | Age: 23
End: 2023-10-18
Payer: COMMERCIAL

## 2023-10-18 VITALS — WEIGHT: 119 LBS | BODY MASS INDEX: 21.9 KG/M2 | HEIGHT: 62 IN

## 2023-10-18 DIAGNOSIS — S93.622A LISFRANC'S SPRAIN, LEFT, INITIAL ENCOUNTER: Primary | ICD-10-CM

## 2023-10-18 DIAGNOSIS — R26.2 DIFFICULTY WALKING: ICD-10-CM

## 2023-10-18 PROCEDURE — G8420 CALC BMI NORM PARAMETERS: HCPCS | Performed by: PODIATRIST

## 2023-10-18 PROCEDURE — 1036F TOBACCO NON-USER: CPT | Performed by: PODIATRIST

## 2023-10-18 PROCEDURE — G8484 FLU IMMUNIZE NO ADMIN: HCPCS | Performed by: PODIATRIST

## 2023-10-18 PROCEDURE — G8427 DOCREV CUR MEDS BY ELIG CLIN: HCPCS | Performed by: PODIATRIST

## 2023-10-18 PROCEDURE — 99213 OFFICE O/P EST LOW 20 MIN: CPT | Performed by: PODIATRIST

## 2023-10-18 NOTE — PROGRESS NOTES
Patient is in today for 2 week PT follow up. Patient says she has gone to 1 PT session and is still having pain in the foot and now moving into her ankle.  Pcp is HAIDER Rios - [place]  Last ov 7/8/23
Per Assessment  Marcia Mcqueen was seen today for foot pain. Diagnoses and all orders for this visit:    Lisfranc's sprain, left, initial encounter    Difficulty walking      Evaluation and management  We did discuss the importance of maintaining physical therapy appointments to improve symptoms. Patient was fitted for an offloading boot today that she can wear during work hours to prevent any additional injury and/or symptoms. Patient will be followed up at a later date for continued evaluation and management. Seen By:    Lynne Villasenor DPM    Electronically signed by Lynne Villasenor DPM on 10/18/2023 at 2:22 PM    This note was created using voice recognition software. The note was reviewed however may contain grammatical errors.

## 2023-11-13 ENCOUNTER — TREATMENT (OUTPATIENT)
Dept: PHYSICAL THERAPY | Age: 23
End: 2023-11-13
Payer: COMMERCIAL

## 2023-11-13 DIAGNOSIS — R26.2 DIFFICULTY WALKING: ICD-10-CM

## 2023-11-13 DIAGNOSIS — M79.672 PAIN IN LEFT FOOT: ICD-10-CM

## 2023-11-13 DIAGNOSIS — S93.622A LISFRANC'S SPRAIN, LEFT, INITIAL ENCOUNTER: Primary | ICD-10-CM

## 2023-11-13 PROCEDURE — 97112 NEUROMUSCULAR REEDUCATION: CPT

## 2023-11-13 NOTE — PROGRESS NOTES
Physical Therapy Daily Treatment Note    Date: 2023  Patient Name: Chilango Pedroza  : 2000   MRN: [id number]  DOInjury: 2023  DOSx: --  Referring Provider: No referring provider defined for this encounter. Medical Diagnosis:      Diagnosis Orders   1. Lisfranc's sprain, left, initial encounter        2. Pain in left foot        3. Difficulty walking            Marlen Savage has a complex case with pain that developed 3 months ago with no known cause. She reports neurological symptoms (tingling) between 1st and 2nd toes and in the calf. Compression of the toes / met heads as well as plantar flexion combined with inversion increases the report of tingling. Dorsiflexion combined with eversion relieves symptoms. She also appears to have weakness of the foot as well. It is possible she has some involvement of branches of the peroneal nerve. Treatment will be to maximize strength and function while not provoking neurological symptoms. Will monitor motor function over time. Outcome Measure:   Lower Extremity Functional Scale (LEFS) 10% impairment    Access Code: [email or web address]  URL: [email or web address]  Date: 2023  Prepared by: Nate Dudley    Exercises  - Towel Scrunches  - 2 x daily - 2 sets - 20 reps  - Ball Rolling  - 2 x daily - 2 sets - 20 reps  - Arch Lifting  - 1 x daily - 2 sets - 10 reps  - Seated Ankle Alphabet  - 2 sets  - Seated Calf Stretch with Strap  - 1-3 x daily - 3 reps - 30s hold  - Seated Great Toe Extension  - 2 x daily - 3 sets - 10 reps  - Single Leg Stance  - 2 x daily - 3 reps - 30s hold  Access Code: [email or web address]  URL: [email or web address]  Date: 10/11/2023  Prepared by: Myron Keller    Exercises  - Towel Scrunches  - 2 x daily - 2 sets - 20 reps  - Ball Rolling  - 2 x daily - 2 sets - 20 reps    X = TO BE PERFORMED NEXT VISIT  > = PROGRESS TO THIS    S: Pt reports 5/10 ankle pain, 0/10 in foot.  States pain usually from mid foot to right above ankle  O:

## 2023-11-15 ENCOUNTER — OFFICE VISIT (OUTPATIENT)
Dept: PODIATRY | Age: 23
End: 2023-11-15
Payer: COMMERCIAL

## 2023-11-15 VITALS — WEIGHT: 119 LBS | HEIGHT: 62 IN | BODY MASS INDEX: 21.9 KG/M2

## 2023-11-15 DIAGNOSIS — M65.9 SYNOVITIS OF LEFT ANKLE: ICD-10-CM

## 2023-11-15 DIAGNOSIS — R26.2 DIFFICULTY WALKING: ICD-10-CM

## 2023-11-15 DIAGNOSIS — S93.622A LISFRANC'S SPRAIN, LEFT, INITIAL ENCOUNTER: Primary | ICD-10-CM

## 2023-11-15 PROCEDURE — G8420 CALC BMI NORM PARAMETERS: HCPCS | Performed by: PODIATRIST

## 2023-11-15 PROCEDURE — 99213 OFFICE O/P EST LOW 20 MIN: CPT | Performed by: PODIATRIST

## 2023-11-15 PROCEDURE — G8484 FLU IMMUNIZE NO ADMIN: HCPCS | Performed by: PODIATRIST

## 2023-11-15 PROCEDURE — 1036F TOBACCO NON-USER: CPT | Performed by: PODIATRIST

## 2023-11-15 PROCEDURE — G8427 DOCREV CUR MEDS BY ELIG CLIN: HCPCS | Performed by: PODIATRIST

## 2023-11-15 NOTE — PROGRESS NOTES
11/15/23     Rajwinder Taveras    : 2000   Sex: female    Age: 26 y.o. Patient's PCP/Provider is:  HAIDER Hayes NP    Subjective:  Patient is seen today for follow-up regarding continued care regarding synovitis issues left lower extremity. Patient stated she got mild improvement with the scheduled physical therapy sessions. Patient is wearing her good supportive shoe gear as instructed. She is not having as much symptoms into the midfoot region but it has traveled proximally into the ankle region at this time. No other additional abnormalities noted. Chief Complaint   Patient presents with    Foot Pain     Left foot        ROS:  Const: Positives and pertinent negatives as per HPI. Musculo: Denies symptoms other than stated above. Neuro: Denies symptoms other than stated above. Skin: Denies symptoms other than stated above. Current Medications:    Current Outpatient Medications:     ondansetron (ZOFRAN ODT) 4 MG disintegrating tablet, Take 1 tablet by mouth every 8 hours as needed for Nausea or Vomiting, Disp: 10 tablet, Rfl: 0    naproxen (NAPROSYN) 500 MG tablet, Take 1 tablet by mouth 2 times daily, Disp: 60 tablet, Rfl: 0    MedroxyPROGESTERone Acetate (DEPO-PROVERA IM), Inject into the muscle, Disp: , Rfl:     dicyclomine (BENTYL) 10 MG capsule, Take 2 capsules by mouth 4 times daily (before meals and nightly) for 3 days, Disp: 24 capsule, Rfl: 0    Allergies:  No Known Allergies    Vitals:    11/15/23 1147   Weight: 54 kg (119 lb)   Height: 1.575 m (5' 2.01\")       Exam:  Neurovascular status unchanged. Minimal tenderness noted into the Lisfranc's region left foot. Mild tenderness noted with active range of motion left ankle and subtalar joint. Minimal edematous issues noted left lower extremity. Mild antalgic gait noted upon evaluation. Diagnostic Studies:     No results found.       Procedures:    None    Plan Per Assessment  Amy Medina was seen today for foot

## 2023-11-15 NOTE — PROGRESS NOTES
Patient is in today for 3 week follow up of left foot. Patient says the pain has moved into her ankle at this time. Patient says she has gone to PT.  Pcp is HAIDER Fuentes - [place]  Last ov 7/8/23

## 2023-11-22 ENCOUNTER — HOSPITAL ENCOUNTER (EMERGENCY)
Age: 23
Discharge: HOME | End: 2023-11-22
Attending: FAMILY MEDICINE
Payer: COMMERCIAL

## 2023-11-22 VITALS
HEART RATE: 66 BPM | DIASTOLIC BLOOD PRESSURE: 68 MMHG | SYSTOLIC BLOOD PRESSURE: 128 MMHG | RESPIRATION RATE: 16 BRPM | OXYGEN SATURATION: 100 % | TEMPERATURE: 98.3 F

## 2023-11-22 DIAGNOSIS — M79.672 LEFT FOOT PAIN: Primary | ICD-10-CM

## 2023-11-22 PROCEDURE — 99283 EMERGENCY DEPT VISIT LOW MDM: CPT

## 2023-11-22 RX ORDER — NAPROXEN 500 MG/1
500 TABLET ORAL 2 TIMES DAILY
Qty: 30 TABLET | Refills: 0 | Status: SHIPPED | OUTPATIENT
Start: 2023-11-22

## 2023-11-22 ASSESSMENT — PAIN SCALES - GENERAL: PAINLEVEL_OUTOF10: 5

## 2023-11-22 ASSESSMENT — PAIN DESCRIPTION - ORIENTATION: ORIENTATION: LEFT

## 2023-11-22 ASSESSMENT — PAIN DESCRIPTION - DESCRIPTORS: DESCRIPTORS: ACHING;THROBBING

## 2023-11-22 ASSESSMENT — PAIN DESCRIPTION - LOCATION: LOCATION: ANKLE

## 2023-11-22 ASSESSMENT — PAIN - FUNCTIONAL ASSESSMENT: PAIN_FUNCTIONAL_ASSESSMENT: 0-10

## 2023-12-06 ENCOUNTER — OFFICE VISIT (OUTPATIENT)
Dept: PODIATRY | Age: 23
End: 2023-12-06
Payer: COMMERCIAL

## 2023-12-06 VITALS — WEIGHT: 119 LBS | HEIGHT: 62 IN | BODY MASS INDEX: 21.9 KG/M2

## 2023-12-06 DIAGNOSIS — M65.9 SYNOVITIS OF LEFT ANKLE: ICD-10-CM

## 2023-12-06 DIAGNOSIS — R20.2 PARESTHESIA OF BOTH LOWER EXTREMITIES: ICD-10-CM

## 2023-12-06 DIAGNOSIS — R26.2 DIFFICULTY WALKING: ICD-10-CM

## 2023-12-06 DIAGNOSIS — G60.9 IDIOPATHIC PERIPHERAL NEUROPATHY: ICD-10-CM

## 2023-12-06 DIAGNOSIS — S93.622A LISFRANC'S SPRAIN, LEFT, INITIAL ENCOUNTER: Primary | ICD-10-CM

## 2023-12-06 PROCEDURE — 1036F TOBACCO NON-USER: CPT | Performed by: PODIATRIST

## 2023-12-06 PROCEDURE — 99213 OFFICE O/P EST LOW 20 MIN: CPT | Performed by: PODIATRIST

## 2023-12-06 PROCEDURE — G8420 CALC BMI NORM PARAMETERS: HCPCS | Performed by: PODIATRIST

## 2023-12-06 PROCEDURE — G8427 DOCREV CUR MEDS BY ELIG CLIN: HCPCS | Performed by: PODIATRIST

## 2023-12-06 PROCEDURE — G8484 FLU IMMUNIZE NO ADMIN: HCPCS | Performed by: PODIATRIST

## 2023-12-06 NOTE — PROGRESS NOTES
Patient is here today to follow up on left ankle pain. She reports for about the past week she has been experiencing swelling to the foot. PCP HAIDER Peñaloza, last seen j07/08/2023.

## 2023-12-06 NOTE — PROGRESS NOTES
23     Nikole Poster    : 2000   Sex: female    Age: 26 y.o. Patient's PCP/Provider is:  HAIDER Carrillo NP    Subjective:  Patient is seen today for follow-up regarding continued evaluation regarding left lower extremity symptoms. Patient has been working daily but has noticed some increased swelling to the left lower extremity. Patient denies any recent injuries or changes in activities. Patient did take pictures of her left foot and lower leg recently which she presented on today's visit. Patient denies any additional issues at this time. Chief Complaint   Patient presents with    Ankle Pain     Left ankle pain        ROS:  Const: Positives and pertinent negatives as per HPI. Musculo: Denies symptoms other than stated above. Neuro: Denies symptoms other than stated above. Skin: Denies symptoms other than stated above. Current Medications:    Current Outpatient Medications:     naproxen (NAPROSYN) 500 MG tablet, Take 1 tablet by mouth 2 times daily, Disp: 30 tablet, Rfl: 0    ondansetron (ZOFRAN ODT) 4 MG disintegrating tablet, Take 1 tablet by mouth every 8 hours as needed for Nausea or Vomiting, Disp: 10 tablet, Rfl: 0    MedroxyPROGESTERone Acetate (DEPO-PROVERA IM), Inject into the muscle, Disp: , Rfl:     Allergies:  No Known Allergies    Vitals:    23 1309   Weight: 54 kg (119 lb)   Height: 1.575 m (5' 2\")       Exam:  Neurovascular status unchanged. Minimal issues noted into the left midfoot region. Paresthesias noted into the left lower extremity from calf region distally. No ecchymotic skin changes noted or any pseudomotor changes noted left lower extremity. Diagnostic Studies:     No results found. Procedures:    None    Plan Per Assessment  Tayo Olvera was seen today for ankle pain.     Diagnoses and all orders for this visit:    Lisfranc's sprain, left, initial encounter    Synovitis of left ankle    Idiopathic peripheral

## 2023-12-07 ENCOUNTER — PROCEDURE VISIT (OUTPATIENT)
Dept: PHYSICAL MEDICINE AND REHAB | Age: 23
End: 2023-12-07
Payer: COMMERCIAL

## 2023-12-07 VITALS — BODY MASS INDEX: 21.16 KG/M2 | HEIGHT: 62 IN | WEIGHT: 115 LBS

## 2023-12-07 DIAGNOSIS — R26.2 DIFFICULTY WALKING: ICD-10-CM

## 2023-12-07 DIAGNOSIS — G60.9 IDIOPATHIC PERIPHERAL NEUROPATHY: ICD-10-CM

## 2023-12-07 DIAGNOSIS — R20.2 PARESTHESIA OF BOTH LOWER EXTREMITIES: ICD-10-CM

## 2023-12-07 DIAGNOSIS — M79.89 SWELLING OF LEFT FOOT: Primary | ICD-10-CM

## 2023-12-07 PROCEDURE — 95886 MUSC TEST DONE W/N TEST COMP: CPT | Performed by: PHYSICAL MEDICINE & REHABILITATION

## 2023-12-07 PROCEDURE — 95909 NRV CNDJ TST 5-6 STUDIES: CPT | Performed by: PHYSICAL MEDICINE & REHABILITATION

## 2023-12-07 NOTE — PATIENT INSTRUCTIONS
Electrodiagnotic Laboratory  Accredited by the [facility] with Exemplary status  RIC[facility] Alfredo D.O. [facility]  [address]. [address], [address] [place] [address]  Phone: [phone]  Fax: [phone]        Today you had an electrodiagnostic exam which included nerve conduction studies (NCS) and electromyography (EMG). This test evaluated the electrical activity of your nerves and muscles to help determine if you have a nerve or muscle disease. This test can help determine the location and type of a nerve or muscle problem. This will help your referring doctor diagnose your condition and determine the appropriate next step in your treatment plan. After your test:    1. There are no long lasting side effects of the test.     2. You may resume your normal activities without restrictions. 3.  Resume any medications that were stopped for the test.     4  If you have sore areas or bruising in your muscles where the needle was placed, apply a cold pack to the sore area for 15-20 minutes three to four times a day as needed for pain. The soreness should go away in about 1-2 days. 5. Your results were provided  Briefly at the end of your test and the final detailed report will be provided to your referring physician, and/or primary care physician and any other parties you requested within 1-2 days of the examination. You may wish to contact your referring provider after a few days to determine what they would like you to do next. 6.  Please call [phone] with any questions or concerns and if you develop increased body temperature/fever, swelling, tenderness, increased pain and/or drainage from the sites where the needle was placed. Thank you for choosing us for your health care needs.

## 2023-12-14 ENCOUNTER — OFFICE VISIT (OUTPATIENT)
Dept: PODIATRY | Age: 23
End: 2023-12-14
Payer: COMMERCIAL

## 2023-12-14 VITALS — HEIGHT: 62 IN | BODY MASS INDEX: 21.16 KG/M2 | WEIGHT: 115 LBS

## 2023-12-14 DIAGNOSIS — R26.2 DIFFICULTY WALKING: ICD-10-CM

## 2023-12-14 DIAGNOSIS — M25.572 PAIN IN LEFT ANKLE AND JOINTS OF LEFT FOOT: ICD-10-CM

## 2023-12-14 DIAGNOSIS — S93.622A LISFRANC'S SPRAIN, LEFT, INITIAL ENCOUNTER: Primary | ICD-10-CM

## 2023-12-14 DIAGNOSIS — M65.9 SYNOVITIS OF LEFT ANKLE: ICD-10-CM

## 2023-12-14 PROCEDURE — G8427 DOCREV CUR MEDS BY ELIG CLIN: HCPCS | Performed by: PODIATRIST

## 2023-12-14 PROCEDURE — 1036F TOBACCO NON-USER: CPT | Performed by: PODIATRIST

## 2023-12-14 PROCEDURE — 99213 OFFICE O/P EST LOW 20 MIN: CPT | Performed by: PODIATRIST

## 2023-12-14 PROCEDURE — G8484 FLU IMMUNIZE NO ADMIN: HCPCS | Performed by: PODIATRIST

## 2023-12-14 PROCEDURE — G8420 CALC BMI NORM PARAMETERS: HCPCS | Performed by: PODIATRIST

## 2023-12-14 NOTE — PROGRESS NOTES
23     Tommy Mann    : 2000   Sex: female    Age: 26 y.o. Patient's PCP/Provider is:  HAIDER Hwang NP    Subjective:  Patient is seen today for follow-up regarding continued symptoms into the left midfoot and hindfoot region. Patient still having symptoms with everyday ambulatory activities. Patient does not wear her ankle support as previously recommended. Patient did have the EMG testing which was reviewed with patient in detail today. No other additional abnormalities noted. Chief Complaint   Patient presents with    Foot Pain     Left foot pain        ROS:  Const: Positives and pertinent negatives as per HPI. Musculo: Denies symptoms other than stated above. Neuro: Denies symptoms other than stated above. Skin: Denies symptoms other than stated above. Current Medications:    Current Outpatient Medications:     naproxen (NAPROSYN) 500 MG tablet, Take 1 tablet by mouth 2 times daily, Disp: 30 tablet, Rfl: 0    ondansetron (ZOFRAN ODT) 4 MG disintegrating tablet, Take 1 tablet by mouth every 8 hours as needed for Nausea or Vomiting, Disp: 10 tablet, Rfl: 0    MedroxyPROGESTERone Acetate (DEPO-PROVERA IM), Inject into the muscle, Disp: , Rfl:     Allergies:  No Known Allergies    Vitals:    23 1427   Weight: 52.2 kg (115 lb)   Height: 1.575 m (5' 2\")       Exam:  Neurovascular status unchanged. Tenderness noted into the dorsal midfoot region which does extend into the anterior hindfoot and ankle region. Edematous changes stable left lower extremity. Mild paresthesias noted into the left lower extremity. Mild antalgic gait noted upon evaluation. Diagnostic Studies:     No results found. Procedures:    None    Plan Per Assessment  Kamilah Solano was seen today for foot pain.     Diagnoses and all orders for this visit:    Lisfranc's sprain, left, initial encounter    Synovitis of left ankle    Pain in left ankle and joints of left foot    Difficulty

## 2023-12-31 ENCOUNTER — HOSPITAL ENCOUNTER (OUTPATIENT)
Dept: CT IMAGING | Age: 23
Discharge: HOME | End: 2024-01-02
Attending: PODIATRIST
Payer: COMMERCIAL

## 2023-12-31 DIAGNOSIS — R26.2 DIFFICULTY WALKING: ICD-10-CM

## 2023-12-31 DIAGNOSIS — M25.572 PAIN IN LEFT ANKLE AND JOINTS OF LEFT FOOT: ICD-10-CM

## 2023-12-31 DIAGNOSIS — M65.9 SYNOVITIS OF LEFT ANKLE: ICD-10-CM

## 2023-12-31 DIAGNOSIS — S93.622A LISFRANC'S SPRAIN, LEFT, INITIAL ENCOUNTER: ICD-10-CM

## 2023-12-31 PROCEDURE — 73701 CT LOWER EXTREMITY W/DYE: CPT

## 2023-12-31 PROCEDURE — 6360000004 HC RX CONTRAST MEDICATION: Performed by: RADIOLOGY

## 2023-12-31 RX ADMIN — IOPAMIDOL 75 ML: 755 INJECTION, SOLUTION INTRAVENOUS at 09:17

## 2024-01-11 ENCOUNTER — OFFICE VISIT (OUTPATIENT)
Dept: PODIATRY | Age: 24
End: 2024-01-11
Payer: COMMERCIAL

## 2024-01-11 VITALS — HEIGHT: 62 IN | WEIGHT: 114 LBS | BODY MASS INDEX: 20.98 KG/M2

## 2024-01-11 DIAGNOSIS — M25.572 PAIN IN LEFT ANKLE AND JOINTS OF LEFT FOOT: ICD-10-CM

## 2024-01-11 DIAGNOSIS — R26.2 DIFFICULTY WALKING: ICD-10-CM

## 2024-01-11 DIAGNOSIS — S93.622A LISFRANC'S SPRAIN, LEFT, INITIAL ENCOUNTER: Primary | ICD-10-CM

## 2024-01-11 PROCEDURE — 99213 OFFICE O/P EST LOW 20 MIN: CPT | Performed by: PODIATRIST

## 2024-01-11 PROCEDURE — 1036F TOBACCO NON-USER: CPT | Performed by: PODIATRIST

## 2024-01-11 PROCEDURE — G8427 DOCREV CUR MEDS BY ELIG CLIN: HCPCS | Performed by: PODIATRIST

## 2024-01-11 PROCEDURE — G8420 CALC BMI NORM PARAMETERS: HCPCS | Performed by: PODIATRIST

## 2024-01-11 PROCEDURE — G8484 FLU IMMUNIZE NO ADMIN: HCPCS | Performed by: PODIATRIST

## 2024-01-11 NOTE — PROGRESS NOTES
24     Yanet Zavala    : 2000   Sex: female    Age: 23 y.o.    Patient's PCP/Provider is:  Brock Hernandez APRN - NP    Subjective:  Patient is seen today for follow-up regarding continued evaluation regarding left lower extremity issues.  Patient presents today to discuss CT scan results.  Patient is still having mild issues into the arch and hindfoot regions left with ambulatory activities.  Patient does try to wear appropriate shoe gear and stockings as instructed.  No other additional abnormalities noted at this time.    Chief Complaint   Patient presents with    Follow-up     CT results        ROS:  Const: Positives and pertinent negatives as per HPI.    Musculo: Denies symptoms other than stated above.  Neuro: Denies symptoms other than stated above.  Skin: Denies symptoms other than stated above.    Current Medications:    Current Outpatient Medications:     ibuprofen (ADVIL;MOTRIN) 600 MG tablet, Take 1 tablet by mouth 3 times daily as needed for Pain, Disp: 30 tablet, Rfl: 0    naproxen (NAPROSYN) 500 MG tablet, Take 1 tablet by mouth 2 times daily, Disp: 30 tablet, Rfl: 0    ondansetron (ZOFRAN ODT) 4 MG disintegrating tablet, Take 1 tablet by mouth every 8 hours as needed for Nausea or Vomiting, Disp: 10 tablet, Rfl: 0    MedroxyPROGESTERone Acetate (DEPO-PROVERA IM), Inject into the muscle, Disp: , Rfl:     Allergies:  No Known Allergies    Vitals:    24 1502   Weight: 51.7 kg (114 lb)   Height: 1.575 m (5' 2.01\")       Exam:  Neurovascular status unchanged.  No edematous issues noted left lower extremity.  Mild tenderness noted into the Lisfranc region left foot.  Adequate range of motion left ankle and subtalar joint region.  Mild antalgic gait noted upon evaluation.      Diagnostic Studies:     CT FOOT LEFT W CONTRAST    Result Date: 2024  EXAMINATION: CT OF THE LEFT FOOT WITH CONTRAST 2023 9:17 am TECHNIQUE: CT of the left foot was performed with the administration

## 2024-03-11 ENCOUNTER — HOSPITAL ENCOUNTER (EMERGENCY)
Age: 24
Discharge: HOME | End: 2024-03-11
Attending: FAMILY MEDICINE
Payer: COMMERCIAL

## 2024-03-11 VITALS
WEIGHT: 110 LBS | SYSTOLIC BLOOD PRESSURE: 144 MMHG | HEART RATE: 93 BPM | OXYGEN SATURATION: 100 % | DIASTOLIC BLOOD PRESSURE: 88 MMHG | RESPIRATION RATE: 16 BRPM | TEMPERATURE: 97.7 F | BODY MASS INDEX: 20.11 KG/M2

## 2024-03-11 DIAGNOSIS — M26.629 TMJ SYNDROME: Primary | ICD-10-CM

## 2024-03-11 PROCEDURE — 99283 EMERGENCY DEPT VISIT LOW MDM: CPT

## 2024-03-11 RX ORDER — IBUPROFEN 800 MG/1
800 TABLET ORAL EVERY 8 HOURS PRN
Qty: 20 TABLET | Refills: 0 | Status: SHIPPED | OUTPATIENT
Start: 2024-03-11

## 2024-03-11 NOTE — ED PROVIDER NOTES
Wt 49.9 kg (110 lb)   SpO2 100%   BMI 20.11 kg/m²   Oxygen Saturation Interpretation: Normal      ---------------------------------------------------PHYSICAL EXAM--------------------------------------    Constitutional/General: Alert and oriented x3, well appearing, non toxic in NAD  Head: NC/AT; there is right TMJ tenderness palpation of moderately severe intensity.  Eyes: PERRL, EOMI  Mouth: Oropharynx clear, handling secretions, no trismus  Neck: Supple, full ROM, no meningeal signs  Pulmonary: Lungs clear to auscultation bilaterally, no wheezes, rales, or rhonchi. Not in respiratory distress  Cardiovascular:  Regular rate and rhythm, no murmurs, gallops, or rubs. 2+ distal pulses  Abdomen: Soft, non tender, non distended,   Extremities: Moves all extremities x 4. Warm and well perfused  Skin: warm and dry without rash  Neurologic: GCS 15,  Psych: Normal Affect      ------------------------------ ED COURSE/MEDICAL DECISION MAKING----------------------  Medications - No data to display      Medical Decision Making:    TMJ syndrome, natural course was explained to the patient I recommended dental/dentistry consultation for further evaluation and treatment.    Counseling:   The emergency provider has spoken with the patient and discussed today’s results, in addition to providing specific details for the plan of care and counseling regarding the diagnosis and prognosis.  Questions are answered at this time and they are agreeable with the plan.      --------------------------------- IMPRESSION AND DISPOSITION ---------------------------------    IMPRESSION  1. TMJ syndrome        DISPOSITION  Disposition: Discharge to home  Patient condition is stable                 Coy Liz MD  03/11/24 3893

## 2024-04-05 ENCOUNTER — HOSPITAL ENCOUNTER (EMERGENCY)
Age: 24
Discharge: HOME | End: 2024-04-05
Attending: STUDENT IN AN ORGANIZED HEALTH CARE EDUCATION/TRAINING PROGRAM
Payer: COMMERCIAL

## 2024-04-05 ENCOUNTER — APPOINTMENT (OUTPATIENT)
Dept: CT IMAGING | Age: 24
End: 2024-04-05
Payer: COMMERCIAL

## 2024-04-05 ENCOUNTER — APPOINTMENT (OUTPATIENT)
Dept: GENERAL RADIOLOGY | Age: 24
End: 2024-04-05
Payer: COMMERCIAL

## 2024-04-05 VITALS
RESPIRATION RATE: 20 BRPM | DIASTOLIC BLOOD PRESSURE: 83 MMHG | OXYGEN SATURATION: 100 % | SYSTOLIC BLOOD PRESSURE: 140 MMHG | TEMPERATURE: 97.3 F | WEIGHT: 110 LBS | BODY MASS INDEX: 20.11 KG/M2 | HEART RATE: 98 BPM
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
## 2024-04-05 LAB
HCG, URINE, POC: NEGATIVE
Lab: NORMAL
NEGATIVE QC PASS/FAIL: NORMAL
POSITIVE QC PASS/FAIL: NORMAL

## 2024-04-05 PROCEDURE — 6370000000 HC RX 637 (ALT 250 FOR IP): Performed by: STUDENT IN AN ORGANIZED HEALTH CARE EDUCATION/TRAINING PROGRAM

## 2024-04-05 PROCEDURE — 72125 CT NECK SPINE W/O DYE: CPT

## 2024-04-05 PROCEDURE — 71045 X-RAY EXAM CHEST 1 VIEW: CPT

## 2024-04-05 PROCEDURE — 73610 X-RAY EXAM OF ANKLE: CPT

## 2024-04-05 PROCEDURE — 99284 EMERGENCY DEPT VISIT MOD MDM: CPT

## 2024-04-05 PROCEDURE — 70450 CT HEAD/BRAIN W/O DYE: CPT

## 2024-04-05 PROCEDURE — 73030 X-RAY EXAM OF SHOULDER: CPT

## 2024-04-05 RX ORDER — HYDROCODONE BITARTRATE AND ACETAMINOPHEN 5; 325 MG/1; MG/1
1 TABLET ORAL ONCE
Status: COMPLETED | OUTPATIENT
Start: 2024-04-05 | End: 2024-04-05

## 2024-04-05 RX ORDER — IBUPROFEN 600 MG/1
600 TABLET ORAL 3 TIMES DAILY PRN
Qty: 30 TABLET | Refills: 0 | Status: SHIPPED | OUTPATIENT
Start: 2024-04-05

## 2024-04-05 RX ORDER — CYCLOBENZAPRINE HCL 10 MG
5 TABLET ORAL 2 TIMES DAILY PRN
Qty: 10 TABLET | Refills: 0 | Status: SHIPPED | OUTPATIENT
Start: 2024-04-05 | End: 2024-04-15

## 2024-04-05 RX ADMIN — HYDROCODONE BITARTRATE AND ACETAMINOPHEN 1 TABLET: 5; 325 TABLET ORAL at 20:42

## 2024-04-05 ASSESSMENT — PAIN SCALES - GENERAL: PAINLEVEL_OUTOF10: 8

## 2024-04-05 ASSESSMENT — LIFESTYLE VARIABLES: HOW OFTEN DO YOU HAVE A DRINK CONTAINING ALCOHOL: NEVER
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
## 2024-07-01 ENCOUNTER — HOSPITAL ENCOUNTER (OUTPATIENT)
Age: 24
Discharge: HOME | End: 2024-07-01
Payer: COMMERCIAL

## 2024-07-01 ENCOUNTER — HOSPITAL ENCOUNTER (OUTPATIENT)
Dept: GENERAL RADIOLOGY | Age: 24
Discharge: HOME | End: 2024-07-03
Payer: COMMERCIAL

## 2024-07-01 ENCOUNTER — HOSPITAL ENCOUNTER (OUTPATIENT)
Age: 24
Discharge: HOME | End: 2024-07-03
Payer: COMMERCIAL

## 2024-07-01 DIAGNOSIS — R10.84 ABDOMINAL PAIN, GENERALIZED: ICD-10-CM

## 2024-07-01 LAB
25(OH)D3 SERPL-MCNC: 30.9 NG/ML (ref 30–100)
ALBUMIN SERPL-MCNC: 4.6 G/DL (ref 3.5–5.2)
ALP SERPL-CCNC: 82 U/L (ref 35–104)
ALT SERPL-CCNC: 12 U/L (ref 0–32)
AMYLASE SERPL-CCNC: 73 U/L (ref 20–100)
ANION GAP SERPL CALCULATED.3IONS-SCNC: 11 MMOL/L (ref 7–16)
AST SERPL-CCNC: 12 U/L (ref 0–31)
BASOPHILS # BLD: 0.05 K/UL (ref 0–0.2)
BASOPHILS NFR BLD: 1 % (ref 0–2)
BILIRUB SERPL-MCNC: 0.5 MG/DL (ref 0–1.2)
BUN SERPL-MCNC: 9 MG/DL (ref 6–20)
CALCIUM SERPL-MCNC: 9.3 MG/DL (ref 8.6–10.2)
CHLORIDE SERPL-SCNC: 105 MMOL/L (ref 98–107)
CO2 SERPL-SCNC: 23 MMOL/L (ref 22–29)
CREAT SERPL-MCNC: 0.9 MG/DL (ref 0.5–1)
CRP SERPL HS-MCNC: <3 MG/L (ref 0–5)
EOSINOPHIL # BLD: 0.45 K/UL (ref 0.05–0.5)
EOSINOPHILS RELATIVE PERCENT: 6 % (ref 0–6)
ERYTHROCYTE [DISTWIDTH] IN BLOOD BY AUTOMATED COUNT: 15 % (ref 11.5–15)
ERYTHROCYTE [SEDIMENTATION RATE] IN BLOOD BY WESTERGREN METHOD: 5 MM/HR (ref 0–20)
FERRITIN SERPL-MCNC: 29 NG/ML
FOLATE SERPL-MCNC: 19 NG/ML (ref 4.8–24.2)
GFR, ESTIMATED: >90 ML/MIN/1.73M2
GLUCOSE SERPL-MCNC: 82 MG/DL (ref 74–99)
HCT VFR BLD AUTO: 41.5 % (ref 34–48)
HGB BLD-MCNC: 12.9 G/DL (ref 11.5–15.5)
IGA SERPL-MCNC: 115 MG/DL (ref 70–400)
IMM GRANULOCYTES # BLD AUTO: 0.03 K/UL (ref 0–0.58)
IMM GRANULOCYTES NFR BLD: 0 % (ref 0–5)
IRON SATN MFR SERPL: 13 % (ref 15–50)
IRON SERPL-MCNC: 51 UG/DL (ref 37–145)
LIPASE SERPL-CCNC: 23 U/L (ref 13–60)
LYMPHOCYTES NFR BLD: 2.55 K/UL (ref 1.5–4)
LYMPHOCYTES RELATIVE PERCENT: 31 % (ref 20–42)
MCH RBC QN AUTO: 26.1 PG (ref 26–35)
MCHC RBC AUTO-ENTMCNC: 31.1 G/DL (ref 32–34.5)
MCV RBC AUTO: 83.8 FL (ref 80–99.9)
MONOCYTES NFR BLD: 0.61 K/UL (ref 0.1–0.95)
MONOCYTES NFR BLD: 8 % (ref 2–12)
NEUTROPHILS NFR BLD: 55 % (ref 43–80)
NEUTS SEG NFR BLD: 4.46 K/UL (ref 1.8–7.3)
PLATELET # BLD AUTO: 222 K/UL (ref 130–450)
PMV BLD AUTO: 9.9 FL (ref 7–12)
POTASSIUM SERPL-SCNC: 4 MMOL/L (ref 3.5–5)
PROT SERPL-MCNC: 7.9 G/DL (ref 6.4–8.3)
RBC # BLD AUTO: 4.95 M/UL (ref 3.5–5.5)
SODIUM SERPL-SCNC: 139 MMOL/L (ref 132–146)
T4 FREE SERPL-MCNC: 1.2 NG/DL (ref 0.9–1.7)
TIBC SERPL-MCNC: 403 UG/DL (ref 250–450)
TSH SERPL DL<=0.05 MIU/L-ACNC: 1.73 UIU/ML (ref 0.27–4.2)
VIT B12 SERPL-MCNC: 486 PG/ML (ref 211–946)
WBC OTHER # BLD: 8.2 K/UL (ref 4.5–11.5)

## 2024-07-01 PROCEDURE — 82728 ASSAY OF FERRITIN: CPT

## 2024-07-01 PROCEDURE — 84439 ASSAY OF FREE THYROXINE: CPT

## 2024-07-01 PROCEDURE — 74018 RADEX ABDOMEN 1 VIEW: CPT

## 2024-07-01 PROCEDURE — 82306 VITAMIN D 25 HYDROXY: CPT

## 2024-07-01 PROCEDURE — 85025 COMPLETE CBC W/AUTO DIFF WBC: CPT

## 2024-07-01 PROCEDURE — 84443 ASSAY THYROID STIM HORMONE: CPT

## 2024-07-01 PROCEDURE — 83540 ASSAY OF IRON: CPT

## 2024-07-01 PROCEDURE — 82784 ASSAY IGA/IGD/IGG/IGM EACH: CPT

## 2024-07-01 PROCEDURE — 86140 C-REACTIVE PROTEIN: CPT

## 2024-07-01 PROCEDURE — 83550 IRON BINDING TEST: CPT

## 2024-07-01 PROCEDURE — 83690 ASSAY OF LIPASE: CPT

## 2024-07-01 PROCEDURE — 36415 COLL VENOUS BLD VENIPUNCTURE: CPT

## 2024-07-01 PROCEDURE — 82150 ASSAY OF AMYLASE: CPT

## 2024-07-01 PROCEDURE — 83516 IMMUNOASSAY NONANTIBODY: CPT

## 2024-07-01 PROCEDURE — 82607 VITAMIN B-12: CPT

## 2024-07-01 PROCEDURE — 80053 COMPREHEN METABOLIC PANEL: CPT

## 2024-07-01 PROCEDURE — 82746 ASSAY OF FOLIC ACID SERUM: CPT

## 2024-07-01 PROCEDURE — 85652 RBC SED RATE AUTOMATED: CPT

## 2024-07-03 LAB
GLIADIN IGA SER IA-ACNC: 0.7 U/ML
GLIADIN IGG SER IA-ACNC: <0.4 U/ML
TISSUE TRANSGLUTAMINASE ANTIBODY IGG: 0.8 U/ML
TTG IGA SER IA-ACNC: 0.2 U/ML

## 2024-07-15 LAB — 25(OH)D3 SERPL-MCNC: NORMAL NG/ML

## 2024-10-14 ENCOUNTER — HOSPITAL ENCOUNTER (EMERGENCY)
Age: 24
Discharge: HOME | End: 2024-10-14
Attending: EMERGENCY MEDICINE
Payer: COMMERCIAL

## 2024-10-14 VITALS
WEIGHT: 106 LBS | DIASTOLIC BLOOD PRESSURE: 88 MMHG | BODY MASS INDEX: 18.78 KG/M2 | RESPIRATION RATE: 18 BRPM | OXYGEN SATURATION: 100 % | TEMPERATURE: 100 F | HEIGHT: 63 IN | HEART RATE: 83 BPM | SYSTOLIC BLOOD PRESSURE: 129 MMHG

## 2024-10-14 DIAGNOSIS — J06.9 VIRAL URI: Primary | ICD-10-CM

## 2024-10-14 LAB
SPECIMEN SOURCE: NORMAL
STREP A, MOLECULAR: NEGATIVE

## 2024-10-14 PROCEDURE — 87651 STREP A DNA AMP PROBE: CPT

## 2024-10-14 PROCEDURE — 99283 EMERGENCY DEPT VISIT LOW MDM: CPT

## 2024-10-14 RX ORDER — BROMPHENIRAMINE MALEATE, PSEUDOEPHEDRINE HYDROCHLORIDE, AND DEXTROMETHORPHAN HYDROBROMIDE 2; 30; 10 MG/5ML; MG/5ML; MG/5ML
5 SYRUP ORAL 4 TIMES DAILY PRN
Qty: 120 ML | Refills: 0 | Status: SHIPPED | OUTPATIENT
Start: 2024-10-14

## 2024-10-14 ASSESSMENT — ENCOUNTER SYMPTOMS
EYE DISCHARGE: 0
COUGH: 1
SHORTNESS OF BREATH: 0
VOMITING: 0
EYE PAIN: 0
ABDOMINAL DISTENTION: 0
SORE THROAT: 0
DIARRHEA: 0
WHEEZING: 0
SINUS PRESSURE: 0
EYE REDNESS: 0
RHINORRHEA: 1
BACK PAIN: 0
NAUSEA: 0

## 2024-10-14 ASSESSMENT — PAIN DESCRIPTION - DESCRIPTORS: DESCRIPTORS: ACHING;DISCOMFORT

## 2024-10-14 ASSESSMENT — PAIN DESCRIPTION - PAIN TYPE: TYPE: ACUTE PAIN

## 2024-10-14 ASSESSMENT — PAIN DESCRIPTION - LOCATION: LOCATION: HEAD;THROAT

## 2024-10-14 ASSESSMENT — PAIN - FUNCTIONAL ASSESSMENT
PAIN_FUNCTIONAL_ASSESSMENT: PREVENTS OR INTERFERES SOME ACTIVE ACTIVITIES AND ADLS
PAIN_FUNCTIONAL_ASSESSMENT: 0-10

## 2024-10-14 ASSESSMENT — PAIN SCALES - GENERAL: PAINLEVEL_OUTOF10: 10

## 2024-10-14 ASSESSMENT — PAIN DESCRIPTION - FREQUENCY: FREQUENCY: CONTINUOUS

## 2024-10-14 NOTE — ED PROVIDER NOTES
The history is provided by the patient.   URI  Presenting symptoms: congestion, cough and rhinorrhea    Presenting symptoms: no ear pain, no fever and no sore throat    Severity:  Moderate  Onset quality:  Sudden  Duration:  1 day  Chronicity:  New  Associated symptoms: no arthralgias, no headaches and no wheezing         Review of Systems   Constitutional:  Negative for chills and fever.   HENT:  Positive for congestion and rhinorrhea. Negative for ear pain, sinus pressure and sore throat.    Eyes:  Negative for pain, discharge and redness.   Respiratory:  Positive for cough. Negative for shortness of breath and wheezing.    Cardiovascular:  Negative for chest pain.   Gastrointestinal:  Negative for abdominal distention, diarrhea, nausea and vomiting.   Genitourinary:  Negative for dysuria and frequency.   Musculoskeletal:  Negative for arthralgias and back pain.   Skin:  Negative for rash and wound.   Neurological:  Negative for weakness and headaches.   Hematological:  Negative for adenopathy.   All other systems reviewed and are negative.       Physical Exam  Vitals and nursing note reviewed.   Constitutional:       Appearance: She is well-developed.   HENT:      Head: Normocephalic and atraumatic.      Right Ear: Tympanic membrane normal.      Left Ear: Tympanic membrane normal.      Nose: Congestion present.   Eyes:      Pupils: Pupils are equal, round, and reactive to light.   Cardiovascular:      Rate and Rhythm: Normal rate and regular rhythm.      Heart sounds: Normal heart sounds. No murmur heard.  Pulmonary:      Effort: Pulmonary effort is normal. No respiratory distress.      Breath sounds: Normal breath sounds. No wheezing or rales.   Abdominal:      General: Bowel sounds are normal.      Palpations: Abdomen is soft.      Tenderness: There is no abdominal tenderness. There is no guarding or rebound.   Musculoskeletal:      Cervical back: Normal range of motion and neck supple.   Skin:     General:

## 2025-02-13 NOTE — PROGRESS NOTES
(Fibular) L4-L5 N None None None N N N N   L. Extensor hallucis longus Deep peroneal (Fibular) L5-S1 N None None None N N N N   L. Peroneus longus Peroneal L5-S1 N None None None N N N N   L. Gastrocnemius (Medial head) Tibial S1-S2 N None None None N N N N   L. Vastus medialis Femoral L2-L4 N None None None N N N N       Study Limitations:  none     Summary of Findings:   Nerve conduction studies: The following nerve conduction studies were abnormal: none. All nerve conduction studies, as listed in the table were normal in latency, amplitude and conduction velocity. Needle EMG:   Needle EMG was performed using a concentric needle. The following abnormalities were seen on needle EMG: none. All observed motor units were normal in amplitude, duration, phases and recruitment and no active denervation signs were seen. Diagnostic Interpretation: This study was normal.      There is no electrodiagnostic evidence for any peripheral mononeuropathy, plexopathy, peripheral polyneuropathy or L2-S1 motor radiculopathy in the left lower extremity. Previous Study: There is not a prior study for comparison. Follow up EMG is recommended if clinically indicated. Technologist: Khurram Crew  Physician:Leona [address] [facility], [facility]   Board Certified Physical Medicine and Rehabilitation      Nerve conduction studies and electromyography were performed according to our laboratory policies and procedures which can be provided upon request. All abnormal values are identified in the table.  Laboratory normal values can also be provided upon request.       Cc: Ольга Lim, HAIDER Brandon NP Home/with Baby

## 2025-04-13 ENCOUNTER — APPOINTMENT (OUTPATIENT)
Dept: CT IMAGING | Age: 25
End: 2025-04-13
Payer: COMMERCIAL

## 2025-04-13 ENCOUNTER — HOSPITAL ENCOUNTER (EMERGENCY)
Age: 25
Discharge: HOME | End: 2025-04-13
Attending: EMERGENCY MEDICINE
Payer: COMMERCIAL

## 2025-04-13 VITALS
HEIGHT: 62 IN | WEIGHT: 106 LBS | TEMPERATURE: 98 F | SYSTOLIC BLOOD PRESSURE: 127 MMHG | HEART RATE: 69 BPM | OXYGEN SATURATION: 100 % | DIASTOLIC BLOOD PRESSURE: 69 MMHG | BODY MASS INDEX: 19.51 KG/M2 | RESPIRATION RATE: 16 BRPM

## 2025-04-13 DIAGNOSIS — R20.2 TINGLING: Primary | ICD-10-CM

## 2025-04-13 LAB
ALBUMIN SERPL-MCNC: 4.5 G/DL (ref 3.5–5.2)
ALP SERPL-CCNC: 79 U/L (ref 35–104)
ALT SERPL-CCNC: 17 U/L (ref 0–32)
ANION GAP SERPL CALCULATED.3IONS-SCNC: 10 MMOL/L (ref 7–16)
AST SERPL-CCNC: 17 U/L (ref 0–31)
BASOPHILS # BLD: 0.05 K/UL (ref 0–0.2)
BASOPHILS NFR BLD: 1 % (ref 0–2)
BILIRUB SERPL-MCNC: 0.3 MG/DL (ref 0–1.2)
BUN SERPL-MCNC: 10 MG/DL (ref 6–20)
CALCIUM SERPL-MCNC: 9.3 MG/DL (ref 8.6–10.2)
CHLORIDE SERPL-SCNC: 105 MMOL/L (ref 98–107)
CK SERPL-CCNC: 196 U/L (ref 20–180)
CO2 SERPL-SCNC: 23 MMOL/L (ref 22–29)
CREAT SERPL-MCNC: 0.9 MG/DL (ref 0.5–1)
EOSINOPHIL # BLD: 0.41 K/UL (ref 0.05–0.5)
EOSINOPHILS RELATIVE PERCENT: 7 % (ref 0–6)
ERYTHROCYTE [DISTWIDTH] IN BLOOD BY AUTOMATED COUNT: 15.4 % (ref 11.5–15)
GFR, ESTIMATED: >90 ML/MIN/1.73M2
GLUCOSE SERPL-MCNC: 93 MG/DL (ref 74–99)
HCG, URINE, POC: NEGATIVE
HCT VFR BLD AUTO: 33.6 % (ref 34–48)
HGB BLD-MCNC: 10.2 G/DL (ref 11.5–15.5)
IMM GRANULOCYTES # BLD AUTO: <0.03 K/UL (ref 0–0.58)
IMM GRANULOCYTES NFR BLD: 0 % (ref 0–5)
LYMPHOCYTES NFR BLD: 1.92 K/UL (ref 1.5–4)
LYMPHOCYTES RELATIVE PERCENT: 32 % (ref 20–42)
Lab: NORMAL
MCH RBC QN AUTO: 23.5 PG (ref 26–35)
MCHC RBC AUTO-ENTMCNC: 30.4 G/DL (ref 32–34.5)
MCV RBC AUTO: 77.4 FL (ref 80–99.9)
MONOCYTES NFR BLD: 0.51 K/UL (ref 0.1–0.95)
MONOCYTES NFR BLD: 9 % (ref 2–12)
NEGATIVE QC PASS/FAIL: NORMAL
NEUTROPHILS NFR BLD: 51 % (ref 43–80)
NEUTS SEG NFR BLD: 3.05 K/UL (ref 1.8–7.3)
PLATELET # BLD AUTO: 300 K/UL (ref 130–450)
PMV BLD AUTO: 9.3 FL (ref 7–12)
POSITIVE QC PASS/FAIL: NORMAL
POTASSIUM SERPL-SCNC: 3.6 MMOL/L (ref 3.5–5)
PROT SERPL-MCNC: 7.6 G/DL (ref 6.4–8.3)
RBC # BLD AUTO: 4.34 M/UL (ref 3.5–5.5)
SODIUM SERPL-SCNC: 138 MMOL/L (ref 132–146)
WBC OTHER # BLD: 6 K/UL (ref 4.5–11.5)

## 2025-04-13 PROCEDURE — 70450 CT HEAD/BRAIN W/O DYE: CPT

## 2025-04-13 PROCEDURE — 82550 ASSAY OF CK (CPK): CPT

## 2025-04-13 PROCEDURE — 85025 COMPLETE CBC W/AUTO DIFF WBC: CPT

## 2025-04-13 PROCEDURE — 80053 COMPREHEN METABOLIC PANEL: CPT

## 2025-04-13 PROCEDURE — 99284 EMERGENCY DEPT VISIT MOD MDM: CPT

## 2025-04-13 RX ORDER — CYCLOBENZAPRINE HCL 5 MG
5 TABLET ORAL 2 TIMES DAILY PRN
Qty: 10 TABLET | Refills: 0 | Status: SHIPPED | OUTPATIENT
Start: 2025-04-13 | End: 2025-04-23

## 2025-04-13 ASSESSMENT — PAIN - FUNCTIONAL ASSESSMENT: PAIN_FUNCTIONAL_ASSESSMENT: NONE - DENIES PAIN

## 2025-04-13 ASSESSMENT — LIFESTYLE VARIABLES
HOW OFTEN DO YOU HAVE A DRINK CONTAINING ALCOHOL: NEVER
HOW MANY STANDARD DRINKS CONTAINING ALCOHOL DO YOU HAVE ON A TYPICAL DAY: PATIENT DOES NOT DRINK

## 2025-04-13 NOTE — ED PROVIDER NOTES
abnormality noted.      2.  Subtotal opacification of the frontal sinuses with lesser involvement of   the ethmoid and maxillary sinuses suggesting chronic sinusitis.             ------------------------- NURSING NOTES AND VITALS REVIEWED ---------------------------  Date / Time Roomed:  4/13/2025  1:19 PM  ED Bed Assignment:  18/18    The nursing notes within the ED encounter and vital signs as below have been reviewed.   /69   Pulse 69   Temp 98 °F (36.7 °C) (Oral)   Resp 16   Ht 1.575 m (5' 2\")   Wt 48.1 kg (106 lb)   SpO2 100%   BMI 19.39 kg/m²   Oxygen Saturation Interpretation: Normal      ------------------------------------------ PROGRESS NOTES ------------------------------------------  I have spoken with the patient and discussed today’s results, in addition to providing specific details for the plan of care and counseling regarding the diagnosis and prognosis.  Their questions are answered at this time and they are agreeable with the plan. I discussed at length with them reasons for immediate return here for re evaluation. They will followup with primary care by calling their office tomorrow.      --------------------------------- ADDITIONAL PROVIDER NOTES ---------------------------------  At this time the patient is without objective evidence of an acute process requiring hospitalization or inpatient management.  They have remained hemodynamically stable throughout their entire ED visit and are stable for discharge with outpatient follow-up.     The plan has been discussed in detail and they are aware of the specific conditions for emergent return, as well as the importance of follow-up.      New Prescriptions    CYCLOBENZAPRINE (FLEXERIL) 5 MG TABLET    Take 1 tablet by mouth 2 times daily as needed for Muscle spasms       Diagnosis:  1. Tingling        Disposition:  Patient's disposition: Discharge to home  Patient's condition is stable.                      Rudolph White DO  04/13/25

## 2025-07-20 ENCOUNTER — HOSPITAL ENCOUNTER (EMERGENCY)
Age: 25
Discharge: HOME | End: 2025-07-20
Attending: STUDENT IN AN ORGANIZED HEALTH CARE EDUCATION/TRAINING PROGRAM
Payer: COMMERCIAL

## 2025-07-20 ENCOUNTER — APPOINTMENT (OUTPATIENT)
Dept: CT IMAGING | Age: 25
End: 2025-07-20
Payer: COMMERCIAL

## 2025-07-20 ENCOUNTER — APPOINTMENT (OUTPATIENT)
Dept: GENERAL RADIOLOGY | Age: 25
End: 2025-07-20
Payer: COMMERCIAL

## 2025-07-20 VITALS
RESPIRATION RATE: 16 BRPM | HEART RATE: 87 BPM | TEMPERATURE: 99.5 F | SYSTOLIC BLOOD PRESSURE: 119 MMHG | WEIGHT: 106 LBS | BODY MASS INDEX: 19.39 KG/M2 | DIASTOLIC BLOOD PRESSURE: 66 MMHG | OXYGEN SATURATION: 98 %

## 2025-07-20 DIAGNOSIS — U07.1 COVID: Primary | ICD-10-CM

## 2025-07-20 DIAGNOSIS — J32.9 CHRONIC SINUSITIS, UNSPECIFIED LOCATION: ICD-10-CM

## 2025-07-20 LAB
ALBUMIN SERPL-MCNC: 4.3 G/DL (ref 3.5–5.2)
ALP SERPL-CCNC: 64 U/L (ref 35–104)
ALT SERPL-CCNC: 17 U/L (ref 0–35)
ANION GAP SERPL CALCULATED.3IONS-SCNC: 11 MMOL/L (ref 7–16)
AST SERPL-CCNC: 26 U/L (ref 0–35)
BACTERIA URNS QL MICRO: ABNORMAL
BASOPHILS # BLD: 0.04 K/UL (ref 0–0.2)
BASOPHILS NFR BLD: 1 % (ref 0–2)
BILIRUB SERPL-MCNC: 0.4 MG/DL (ref 0–1.2)
BILIRUB UR QL STRIP: NEGATIVE
BUN SERPL-MCNC: 8 MG/DL (ref 6–20)
CALCIUM SERPL-MCNC: 9 MG/DL (ref 8.6–10)
CHLORIDE SERPL-SCNC: 100 MMOL/L (ref 98–107)
CLARITY UR: ABNORMAL
CO2 SERPL-SCNC: 20 MMOL/L (ref 22–29)
COLOR UR: YELLOW
CREAT SERPL-MCNC: 0.9 MG/DL (ref 0.5–1)
EKG ATRIAL RATE: 81 BPM
EKG P AXIS: 28 DEGREES
EKG P-R INTERVAL: 118 MS
EKG Q-T INTERVAL: 362 MS
EKG QRS DURATION: 96 MS
EKG QTC CALCULATION (BAZETT): 420 MS
EKG R AXIS: 68 DEGREES
EKG T AXIS: 39 DEGREES
EKG VENTRICULAR RATE: 81 BPM
EOSINOPHIL # BLD: 0.23 K/UL (ref 0.05–0.5)
EOSINOPHILS RELATIVE PERCENT: 3 % (ref 0–6)
EPI CELLS #/AREA URNS HPF: ABNORMAL /HPF
ERYTHROCYTE [DISTWIDTH] IN BLOOD BY AUTOMATED COUNT: 16.5 % (ref 11.5–15)
FLUAV RNA RESP QL NAA+PROBE: NOT DETECTED
FLUBV RNA RESP QL NAA+PROBE: NOT DETECTED
GFR, ESTIMATED: >90 ML/MIN/1.73M2
GLUCOSE SERPL-MCNC: 104 MG/DL (ref 74–99)
GLUCOSE UR STRIP-MCNC: NEGATIVE MG/DL
HCG, URINE, POC: NEGATIVE
HCT VFR BLD AUTO: 36.1 % (ref 34–48)
HGB BLD-MCNC: 10.9 G/DL (ref 11.5–15.5)
HGB UR QL STRIP.AUTO: NEGATIVE
IMM GRANULOCYTES # BLD AUTO: 0.03 K/UL (ref 0–0.58)
IMM GRANULOCYTES NFR BLD: 0 % (ref 0–5)
KETONES UR STRIP-MCNC: 15 MG/DL
LEUKOCYTE ESTERASE UR QL STRIP: NEGATIVE
LYMPHOCYTES NFR BLD: 0.72 K/UL (ref 1.5–4)
LYMPHOCYTES RELATIVE PERCENT: 10 % (ref 20–42)
Lab: NORMAL
MCH RBC QN AUTO: 22.1 PG (ref 26–35)
MCHC RBC AUTO-ENTMCNC: 30.2 G/DL (ref 32–34.5)
MCV RBC AUTO: 73.2 FL (ref 80–99.9)
MONOCYTES NFR BLD: 1.02 K/UL (ref 0.1–0.95)
MONOCYTES NFR BLD: 14 % (ref 2–12)
NEGATIVE QC PASS/FAIL: NORMAL
NEUTROPHILS NFR BLD: 72 % (ref 43–80)
NEUTS SEG NFR BLD: 5.19 K/UL (ref 1.8–7.3)
NITRITE UR QL STRIP: NEGATIVE
PH UR STRIP: 6.5 [PH] (ref 5–8)
PLATELET # BLD AUTO: 223 K/UL (ref 130–450)
PMV BLD AUTO: 9 FL (ref 7–12)
POSITIVE QC PASS/FAIL: NORMAL
POTASSIUM SERPL-SCNC: 4.1 MMOL/L (ref 3.5–5.1)
PROT SERPL-MCNC: 7.8 G/DL (ref 6.4–8.3)
PROT UR STRIP-MCNC: NEGATIVE MG/DL
RBC # BLD AUTO: 4.93 M/UL (ref 3.5–5.5)
RBC #/AREA URNS HPF: ABNORMAL /HPF
SARS-COV-2 RNA RESP QL NAA+PROBE: DETECTED
SODIUM SERPL-SCNC: 131 MMOL/L (ref 136–145)
SOURCE: ABNORMAL
SP GR UR STRIP: 1.01 (ref 1–1.03)
SPECIMEN DESCRIPTION: ABNORMAL
SPECIMEN SOURCE: NORMAL
STREP A, MOLECULAR: NEGATIVE
UROBILINOGEN UR STRIP-ACNC: 0.2 EU/DL (ref 0–1)
WBC #/AREA URNS HPF: ABNORMAL /HPF
WBC OTHER # BLD: 7.2 K/UL (ref 4.5–11.5)

## 2025-07-20 PROCEDURE — 70450 CT HEAD/BRAIN W/O DYE: CPT

## 2025-07-20 PROCEDURE — 85025 COMPLETE CBC W/AUTO DIFF WBC: CPT

## 2025-07-20 PROCEDURE — 93010 ELECTROCARDIOGRAM REPORT: CPT | Performed by: INTERNAL MEDICINE

## 2025-07-20 PROCEDURE — 74176 CT ABD & PELVIS W/O CONTRAST: CPT

## 2025-07-20 PROCEDURE — 80053 COMPREHEN METABOLIC PANEL: CPT

## 2025-07-20 PROCEDURE — 87636 SARSCOV2 & INF A&B AMP PRB: CPT

## 2025-07-20 PROCEDURE — 71046 X-RAY EXAM CHEST 2 VIEWS: CPT

## 2025-07-20 PROCEDURE — 96375 TX/PRO/DX INJ NEW DRUG ADDON: CPT

## 2025-07-20 PROCEDURE — 93005 ELECTROCARDIOGRAM TRACING: CPT | Performed by: STUDENT IN AN ORGANIZED HEALTH CARE EDUCATION/TRAINING PROGRAM

## 2025-07-20 PROCEDURE — 6360000002 HC RX W HCPCS: Performed by: STUDENT IN AN ORGANIZED HEALTH CARE EDUCATION/TRAINING PROGRAM

## 2025-07-20 PROCEDURE — 96374 THER/PROPH/DIAG INJ IV PUSH: CPT

## 2025-07-20 PROCEDURE — 2580000003 HC RX 258: Performed by: STUDENT IN AN ORGANIZED HEALTH CARE EDUCATION/TRAINING PROGRAM

## 2025-07-20 PROCEDURE — 81001 URINALYSIS AUTO W/SCOPE: CPT

## 2025-07-20 PROCEDURE — 87651 STREP A DNA AMP PROBE: CPT

## 2025-07-20 PROCEDURE — 87086 URINE CULTURE/COLONY COUNT: CPT

## 2025-07-20 PROCEDURE — 99285 EMERGENCY DEPT VISIT HI MDM: CPT

## 2025-07-20 RX ORDER — ORPHENADRINE CITRATE 30 MG/ML
60 INJECTION INTRAMUSCULAR; INTRAVENOUS ONCE
Status: COMPLETED | OUTPATIENT
Start: 2025-07-20 | End: 2025-07-20

## 2025-07-20 RX ORDER — 0.9 % SODIUM CHLORIDE 0.9 %
1000 INTRAVENOUS SOLUTION INTRAVENOUS ONCE
Status: COMPLETED | OUTPATIENT
Start: 2025-07-20 | End: 2025-07-20

## 2025-07-20 RX ORDER — METOCLOPRAMIDE HYDROCHLORIDE 5 MG/ML
10 INJECTION INTRAMUSCULAR; INTRAVENOUS ONCE
Status: COMPLETED | OUTPATIENT
Start: 2025-07-20 | End: 2025-07-20

## 2025-07-20 RX ORDER — KETOROLAC TROMETHAMINE 30 MG/ML
15 INJECTION, SOLUTION INTRAMUSCULAR; INTRAVENOUS ONCE
Status: COMPLETED | OUTPATIENT
Start: 2025-07-20 | End: 2025-07-20

## 2025-07-20 RX ORDER — DIPHENHYDRAMINE HYDROCHLORIDE 50 MG/ML
25 INJECTION, SOLUTION INTRAMUSCULAR; INTRAVENOUS ONCE
Status: COMPLETED | OUTPATIENT
Start: 2025-07-20 | End: 2025-07-20

## 2025-07-20 RX ADMIN — DIPHENHYDRAMINE HYDROCHLORIDE 25 MG: 50 INJECTION INTRAMUSCULAR; INTRAVENOUS at 08:23

## 2025-07-20 RX ADMIN — ORPHENADRINE CITRATE 60 MG: 30 INJECTION, SOLUTION INTRAMUSCULAR; INTRAVENOUS at 11:36

## 2025-07-20 RX ADMIN — SODIUM CHLORIDE 1000 ML: 0.9 INJECTION, SOLUTION INTRAVENOUS at 08:21

## 2025-07-20 RX ADMIN — METOCLOPRAMIDE 10 MG: 5 INJECTION, SOLUTION INTRAMUSCULAR; INTRAVENOUS at 08:21

## 2025-07-20 RX ADMIN — KETOROLAC TROMETHAMINE 15 MG: 30 INJECTION, SOLUTION INTRAMUSCULAR at 11:23

## 2025-07-20 ASSESSMENT — PAIN DESCRIPTION - LOCATION: LOCATION: GENERALIZED

## 2025-07-20 ASSESSMENT — PAIN SCALES - GENERAL
PAINLEVEL_OUTOF10: 10
PAINLEVEL_OUTOF10: 4

## 2025-07-20 ASSESSMENT — LIFESTYLE VARIABLES: HOW MANY STANDARD DRINKS CONTAINING ALCOHOL DO YOU HAVE ON A TYPICAL DAY: PATIENT DOES NOT DRINK

## 2025-07-20 ASSESSMENT — PAIN - FUNCTIONAL ASSESSMENT: PAIN_FUNCTIONAL_ASSESSMENT: 0-10

## 2025-07-20 NOTE — ED PROVIDER NOTES
Yanet Zavala is a 25-year-old female present emerged part with concern for nausea, fever, chills.  Patient has also had some congestion noted last night.  Patient also reported left-sided flank pain.  Patient not tried thing for symptoms.  Patient has of a headache worse in front of the head.  Denies any vision change is not having difficulty ambulating.  This is patient's second day of symptoms    The history is provided by the patient and medical records.        Review of Systems   Constitutional:  Positive for chills, fatigue and fever. Negative for diaphoresis.   HENT:  Positive for congestion.    Eyes:  Negative for photophobia and visual disturbance.   Respiratory:  Negative for cough, chest tightness and shortness of breath.    Cardiovascular:  Negative for chest pain, palpitations and leg swelling.   Gastrointestinal:  Negative for abdominal distention, abdominal pain, diarrhea, nausea and vomiting.   Genitourinary:  Positive for flank pain. Negative for dysuria.   Musculoskeletal:  Negative for neck pain and neck stiffness.   Skin:  Negative for pallor and rash.   Neurological:  Positive for headaches.   Psychiatric/Behavioral:  Negative for confusion.         Physical Exam  Vitals and nursing note reviewed.   Constitutional:       General: She is not in acute distress.     Appearance: Normal appearance. She is not ill-appearing.   HENT:      Head: Normocephalic and atraumatic.   Eyes:      General: No scleral icterus.     Conjunctiva/sclera: Conjunctivae normal.      Pupils: Pupils are equal, round, and reactive to light.   Neck:      Comments: No meningeal signs on exam  Cardiovascular:      Rate and Rhythm: Normal rate and regular rhythm.   Pulmonary:      Effort: Pulmonary effort is normal.      Breath sounds: Normal breath sounds.   Abdominal:      General: Bowel sounds are normal. There is no distension.      Palpations: Abdomen is soft.      Tenderness: There is no abdominal tenderness. There

## 2025-07-20 NOTE — DISCHARGE INSTRUCTIONS
CT HEAD WO CONTRAST   Final Result      1.  Ethmoid and frontal and mild maxillary sinusitis.  As this is similar to   the prior study this suggests chronic sinusitis.      2.  No acute intracranial abnormality noted.         CT ABDOMEN PELVIS WO CONTRAST Additional Contrast? None   Final Result      1.  No evidence of urinary tract calculi or any evidence of obstructive   uropathy.      2.  3 cm simple appearing right ovarian cyst requiring no additional   follow-up in a patient of this age.      3.  Small amount of retrouterine pelvic fluid.      4.  Possible mild degree of constipation most evident within the right colon.   Otherwise nonspecific bowel gas pattern.      5.  Otherwise no acute pathology or significant change noted.         XR CHEST (2 VW)   Final Result   No acute process.

## 2025-07-21 LAB
MICROORGANISM SPEC CULT: NO GROWTH
SERVICE CMNT-IMP: NORMAL
SPECIMEN DESCRIPTION: NORMAL

## 2025-07-23 ENCOUNTER — HOSPITAL ENCOUNTER (EMERGENCY)
Age: 25
Discharge: HOME | End: 2025-07-23
Attending: EMERGENCY MEDICINE
Payer: COMMERCIAL

## 2025-07-23 VITALS
TEMPERATURE: 99 F | HEART RATE: 66 BPM | DIASTOLIC BLOOD PRESSURE: 84 MMHG | OXYGEN SATURATION: 100 % | SYSTOLIC BLOOD PRESSURE: 130 MMHG | RESPIRATION RATE: 16 BRPM

## 2025-07-23 DIAGNOSIS — U07.1 COVID-19: Primary | ICD-10-CM

## 2025-07-23 PROCEDURE — 99283 EMERGENCY DEPT VISIT LOW MDM: CPT

## 2025-07-23 RX ORDER — ONDANSETRON 4 MG/1
4 TABLET, ORALLY DISINTEGRATING ORAL 3 TIMES DAILY PRN
Qty: 8 TABLET | Refills: 0 | Status: SHIPPED | OUTPATIENT
Start: 2025-07-23

## 2025-07-23 ASSESSMENT — PAIN - FUNCTIONAL ASSESSMENT: PAIN_FUNCTIONAL_ASSESSMENT: NONE - DENIES PAIN

## 2025-07-23 NOTE — ED PROVIDER NOTES
home  Patient condition is good                  Gladys Villa MD  07/23/25 6421     Detail Level: Zone Detail Level: Simple Include Location In Plan?: No

## 2025-07-25 ENCOUNTER — HOSPITAL ENCOUNTER (EMERGENCY)
Age: 25
Discharge: HOME | End: 2025-07-25
Attending: EMERGENCY MEDICINE
Payer: COMMERCIAL

## 2025-07-25 VITALS
HEART RATE: 62 BPM | RESPIRATION RATE: 16 BRPM | OXYGEN SATURATION: 100 % | SYSTOLIC BLOOD PRESSURE: 116 MMHG | DIASTOLIC BLOOD PRESSURE: 78 MMHG | TEMPERATURE: 98.3 F

## 2025-07-25 DIAGNOSIS — R11.2 NAUSEA AND VOMITING, UNSPECIFIED VOMITING TYPE: Primary | ICD-10-CM

## 2025-07-25 DIAGNOSIS — U07.1 COVID-19: ICD-10-CM

## 2025-07-25 PROCEDURE — 99283 EMERGENCY DEPT VISIT LOW MDM: CPT

## 2025-07-25 RX ORDER — PROMETHAZINE HYDROCHLORIDE 25 MG/1
25 TABLET ORAL 4 TIMES DAILY PRN
Qty: 10 TABLET | Refills: 0 | Status: SHIPPED | OUTPATIENT
Start: 2025-07-25 | End: 2025-08-01

## 2025-07-25 ASSESSMENT — PAIN - FUNCTIONAL ASSESSMENT: PAIN_FUNCTIONAL_ASSESSMENT: NONE - DENIES PAIN

## 2025-07-25 NOTE — ED PROVIDER NOTES
YADIRAF-02/22/23, LOV-12/14/22   EXAM--------------------------------------      Constitutional/General: Alert and oriented x3, well appearing, non toxic in NAD  Head: NC/AT  Eyes: PERRL, EOMI  Mouth: Oropharynx clear, handling secretions, no trismus  Neck: Supple, full ROM, no meningeal signs  Pulmonary: Lungs clear to auscultation bilaterally, no wheezes, rales, or rhonchi. Not in respiratory distress  Cardiovascular:  Regular rate and rhythm, no murmurs, gallops, or rubs. 2+ distal pulses  Abdomen: Soft, non tender, non distended,   Extremities: Moves all extremities x 4. Warm and well perfused  Skin: warm and dry without rash  Neurologic: GCS 15,  Psych: Normal Affect      ------------------------------ ED COURSE/MEDICAL DECISION MAKING----------------------  Medications - No data to display      Medical Decision Making:    Trial with Phenergan.  Follow-up with primary care physician.  When patient was asked where her mask was, she said I do not know.  Patient's Medications   New Prescriptions    PROMETHAZINE (PHENERGAN) 25 MG TABLET    Take 1 tablet by mouth 4 times daily as needed for Nausea   Previous Medications    BROMPHENIRAMINE-PSEUDOEPHEDRINE-DM 2-30-10 MG/5ML SYRUP    Take 5 mLs by mouth 4 times daily as needed for Congestion or Cough    IBUPROFEN (ADVIL;MOTRIN) 600 MG TABLET    Take 1 tablet by mouth 3 times daily as needed for Pain    MEDROXYPROGESTERONE ACETATE (DEPO-PROVERA IM)    Inject into the muscle    ONDANSETRON (ZOFRAN-ODT) 4 MG DISINTEGRATING TABLET    Take 1 tablet by mouth 3 times daily as needed for Nausea or Vomiting   Modified Medications    No medications on file   Discontinued Medications    No medications on file         Counseling:   The emergency provider has spoken with the patient and discussed today’s results, in addition to providing specific details for the plan of care and counseling regarding the diagnosis and prognosis.  Questions are answered at this time and they are agreeable with the